# Patient Record
Sex: FEMALE | Race: WHITE | NOT HISPANIC OR LATINO | Employment: FULL TIME | URBAN - METROPOLITAN AREA
[De-identification: names, ages, dates, MRNs, and addresses within clinical notes are randomized per-mention and may not be internally consistent; named-entity substitution may affect disease eponyms.]

---

## 2018-06-08 ENCOUNTER — ANESTHESIA - HEALTHEAST (OUTPATIENT)
Dept: SURGERY | Facility: CLINIC | Age: 33
End: 2018-06-08

## 2018-06-08 ENCOUNTER — SURGERY - HEALTHEAST (OUTPATIENT)
Dept: SURGERY | Facility: CLINIC | Age: 33
End: 2018-06-08

## 2020-06-23 ENCOUNTER — COMMUNICATION - HEALTHEAST (OUTPATIENT)
Dept: MEDSURG UNIT | Facility: CLINIC | Age: 35
End: 2020-06-23

## 2020-06-23 ENCOUNTER — COMMUNICATION - HEALTHEAST (OUTPATIENT)
Dept: SCHEDULING | Facility: CLINIC | Age: 35
End: 2020-06-23

## 2021-05-28 ENCOUNTER — RECORDS - HEALTHEAST (OUTPATIENT)
Dept: ADMINISTRATIVE | Facility: CLINIC | Age: 36
End: 2021-05-28

## 2021-05-30 ENCOUNTER — RECORDS - HEALTHEAST (OUTPATIENT)
Dept: ADMINISTRATIVE | Facility: CLINIC | Age: 36
End: 2021-05-30

## 2021-06-01 VITALS — WEIGHT: 125 LBS | BODY MASS INDEX: 24.41 KG/M2

## 2021-06-02 ENCOUNTER — RECORDS - HEALTHEAST (OUTPATIENT)
Dept: ADMINISTRATIVE | Facility: CLINIC | Age: 36
End: 2021-06-02

## 2021-06-09 NOTE — TELEPHONE ENCOUNTER
Patient reports she received a Methotrexate shot in her leg for an ectopic pregnancy. Was discharged home from the [facility] today. Patient reports she was having vaginal bleeding. She soaked 2 thin pads in one hour. Reports bleeding just stopped. Patient was told on the discharge paperwork to follow up with Dr Gomez at [facility] on 6/26. Will page on call provider for [facility] to call patient back directly.     RN paged on call provider for [facility], Dr Santoyo via answering service at 5:35 pm to call patient back directly at [phone].    Rn then called patient to let her know that the doctor on call will call her. Patient states the Doctor already called and she spoke to the on call. Patient had no further questions.     Caryn Alston RN/[facility]      Reason for Disposition    [1] Caller requests to speak ONLY to PCP AND [2] URGENT question    Additional Information    Negative: Lab calling with strep throat test results and triager can call in prescription    Negative: Lab calling with urinalysis test results and triager can call in prescription    Negative: Medication questions    Negative: ED call to PCP    Negative: Physician call to PCP    Negative: Call about patient who is currently hospitalized    Negative: Lab or radiology calling with CRITICAL test results    Negative: [1] Prescription not at pharmacy AND [2] was prescribed today by PCP    Negative: [1] Follow-up call from patient regarding patient's clinical status AND [2] information urgent    Protocols used: [facility]

## 2021-06-16 PROBLEM — N93.9 VAGINAL BLEEDING: Status: ACTIVE | Noted: 2020-06-22

## 2021-06-16 PROBLEM — O00.90 ECTOPIC PREGNANCY: Status: ACTIVE | Noted: 2018-06-08

## 2021-06-18 NOTE — ANESTHESIA POSTPROCEDURE EVALUATION
Patient: El Booth  LAPAROSCOPY LEFT SIDED SALPINGECTOMY EVACUATION HEMO PERITONEUM  Anesthesia type: general    Patient location: Phase II Recovery  Last vitals:   Vitals:    06/08/18 0445   BP: 100/52   Pulse: 88   Resp: 16   Temp: 36.7  C (98.1  F)   SpO2: 97%     Post vital signs: stable  Level of consciousness: awake and responds to simple questions  Post-anesthesia pain: pain controlled  Post-anesthesia nausea and vomiting: no  Pulmonary: unassisted, return to baseline  Cardiovascular: stable and blood pressure at baseline  Hydration: adequate  Anesthetic events: no    QCDR Measures:  ASA# 11 - Tiarra-op Cardiac Arrest: ASA11B - Patient did NOT experience unanticipated cardiac arrest  ASA# 12 - Tiarra-op Mortality Rate: ASA12B - Patient did NOT die  ASA# 13 - PACU Re-Intubation Rate: ASA13B - Patient did NOT require a new airway mgmt  ASA# 10 - Composite Anes Safety: ASA10A - No serious adverse event    Additional Notes: doing well, no complaints; OK to discharge home.

## 2021-06-18 NOTE — ANESTHESIA CARE TRANSFER NOTE
Last vitals:   Vitals:    06/08/18 0332   BP: 105/61   Pulse: 85   Resp: 16   Temp: 36.6  C (97.9  F)   SpO2: 100%     Patient's level of consciousness is awake  Spontaneous respirations: yes  Maintains airway independently: yes  Dentition unchanged: yes  Oropharynx: oropharynx clear of all foreign objects    QCDR Measures:  ASA# 20 - Surgical Safety Checklist: WHO surgical safety checklist completed prior to induction  PQRS# 430 - Adult PONV Prevention: 4558F - Pt received => 2 anti-emetic agents (different classes) preop & intraop  ASA# 8 - Peds PONV Prevention: NA - Not pediatric patient, not GA or 2 or more risk factors NOT present  PQRS# 424 - Tiarra-op Temp Management: 4559F - At least one body temp DOCUMENTED => 35.5C or 95.9F within required timeframe  PQRS# 426 - PACU Transfer Protocol: - Transfer of care checklist used  ASA# 14 - Acute Post-op Pain: ASA14B - Patient did NOT experience pain >= 7 out of 10

## 2021-06-18 NOTE — ANESTHESIA PREPROCEDURE EVALUATION
Anesthesia Evaluation      Patient summary reviewed   No history of anesthetic complications     Airway   Mallampati: I  Neck ROM: full   Pulmonary - negative ROS and normal exam   (+) a smoker                         Cardiovascular - negative ROS and normal exam  Exercise tolerance: > or = 4 METS   Neuro/Psych    (+) depression, anxiety/panic attacks,     Comments: Chronic headache without history of migraine.    Endo/Other - negative ROS      GI/Hepatic/Renal - negative ROS           Dental - normal exam                        Anesthesia Plan  Planned anesthetic: general endotracheal  Decadron, Zofran.  Diprivan infusion.  Ketamine.  Toradol, Tylenol.  ASA 2   Induction: intravenous   Anesthetic plan and risks discussed with: patient  Anesthesia plan special considerations: antiemetics,   Post-op plan: routine recovery

## 2021-06-20 NOTE — LETTER
Letter by Nissen, Lynette, RN at      Author: Nissen, Lynette, RN Service: -- Author Type: --    Filed:  Encounter Date: 6/23/2020 Status: (Other)       6/23/2020        El Booth  [address]  [facility] [address]    This letter provides a written record that you were tested for COVID-19 on 6/22/20.     Your result was negative. This means that we didnt find the virus that causes COVID-19 in your sample. A test may show negative when you do actually have the virus. This can happen when the virus is in the early stages of infection, before you feel illness symptoms.    If you have symptoms   Stay home and away from others (self-isolate) until you meet ALL of the guidelines below:    Youve had no fever--and no medicine that reduces fever--for 3 full days (72 hours). And ?    Your other symptoms have gotten better. For example, your cough or breathing has improved. And?    At least 10 days have passed since your symptoms started.    During this time:    Stay home. Dont go to work, school or anywhere else.     Stay in your own room, including for meals. Use your own bathroom if you can.    Stay away from others in your home. No hugging, kissing or shaking hands. No visitors.    Clean high touch surfaces often (doorknobs, counters, handles, etc.). Use a household cleaning spray or wipes. You can find a full list on the EPA website at www.epa.gov/pesticide-registration/list-n-disinfectants-use-against-sars-cov-2.    Cover your mouth and nose with a mask, tissue or washcloth to avoid spreading germs.    Wash your hands and face often with soap and water.    Going back to work  Check with your employer for any guidelines to follow for going back to work.    Employers: This document serves as formal notice that your employee tested negative for COVID-19, as of the testing date shown above.

## 2021-06-27 ENCOUNTER — HEALTH MAINTENANCE LETTER (OUTPATIENT)
Age: 36
End: 2021-06-27

## 2021-10-16 ENCOUNTER — HEALTH MAINTENANCE LETTER (OUTPATIENT)
Age: 36
End: 2021-10-16

## 2022-01-19 ENCOUNTER — HOSPITAL ENCOUNTER (EMERGENCY)
Facility: CLINIC | Age: 37
Discharge: HOME | End: 2022-01-19
Attending: EMERGENCY MEDICINE | Admitting: EMERGENCY MEDICINE
Payer: COMMERCIAL

## 2022-01-19 VITALS
HEART RATE: 111 BPM | OXYGEN SATURATION: 97 % | WEIGHT: 123 LBS | SYSTOLIC BLOOD PRESSURE: 115 MMHG | TEMPERATURE: 98.1 F | DIASTOLIC BLOOD PRESSURE: 72 MMHG | BODY MASS INDEX: 24.02 KG/M2 | RESPIRATION RATE: 18 BRPM

## 2022-01-19 DIAGNOSIS — G43.109 MIGRAINE WITH AURA AND WITHOUT STATUS MIGRAINOSUS, NOT INTRACTABLE: ICD-10-CM

## 2022-01-19 PROCEDURE — 99284 EMERGENCY DEPT VISIT MOD MDM: CPT | Mod: 25

## 2022-01-19 PROCEDURE — 250N000011 HC RX IP 250 OP 636: Performed by: EMERGENCY MEDICINE

## 2022-01-19 PROCEDURE — 96361 HYDRATE IV INFUSION ADD-ON: CPT

## 2022-01-19 PROCEDURE — 96375 TX/PRO/DX INJ NEW DRUG ADDON: CPT

## 2022-01-19 PROCEDURE — 258N000003 HC RX IP 258 OP 636: Performed by: EMERGENCY MEDICINE

## 2022-01-19 PROCEDURE — 96374 THER/PROPH/DIAG INJ IV PUSH: CPT

## 2022-01-19 RX ORDER — ESCITALOPRAM OXALATE 5 MG/1
5 TABLET ORAL DAILY
COMMUNITY
End: 2024-02-02

## 2022-01-19 RX ORDER — SODIUM CHLORIDE 9 MG/ML
INJECTION, SOLUTION INTRAVENOUS CONTINUOUS
Status: DISCONTINUED | OUTPATIENT
Start: 2022-01-19 | End: 2022-01-19 | Stop reason: HOSPADM

## 2022-01-19 RX ORDER — TRAZODONE HYDROCHLORIDE 50 MG/1
50 TABLET, FILM COATED ORAL AT BEDTIME
COMMUNITY
End: 2024-02-02

## 2022-01-19 RX ORDER — KETOROLAC TROMETHAMINE 15 MG/ML
15 INJECTION, SOLUTION INTRAMUSCULAR; INTRAVENOUS ONCE
Status: COMPLETED | OUTPATIENT
Start: 2022-01-19 | End: 2022-01-19

## 2022-01-19 RX ORDER — METOCLOPRAMIDE HYDROCHLORIDE 5 MG/ML
10 INJECTION INTRAMUSCULAR; INTRAVENOUS ONCE
Status: COMPLETED | OUTPATIENT
Start: 2022-01-19 | End: 2022-01-19

## 2022-01-19 RX ADMIN — METOCLOPRAMIDE HYDROCHLORIDE 10 MG: 5 INJECTION INTRAMUSCULAR; INTRAVENOUS at 02:10

## 2022-01-19 RX ADMIN — SODIUM CHLORIDE 1000 ML: 9 INJECTION, SOLUTION INTRAVENOUS at 02:10

## 2022-01-19 RX ADMIN — KETOROLAC TROMETHAMINE 15 MG: 15 INJECTION, SOLUTION INTRAMUSCULAR; INTRAVENOUS at 02:09

## 2022-01-19 ASSESSMENT — ENCOUNTER SYMPTOMS
NAUSEA: 1
VOMITING: 1
HEADACHES: 1
PHOTOPHOBIA: 1
WEAKNESS: 0

## 2022-01-19 NOTE — ED NOTES
Pt arrives to ED with c/o severe headache with nausea and photosensitivity. Patient presents with visible tremors and is tachycardic on assessment. VS otherwise stable.

## 2022-01-19 NOTE — ED PROVIDER NOTES
EMERGENCY DEPARTMENT ENCOUNTER      NAME: El Booth  AGE: 36 year old female  YOB: 1985  MRN: [id number]  EVALUATION DATE & TIME: 1/19/2022  1:40 AM    PCP: No primary care provider on file.    ED PROVIDER: Luis Mullins M.D.      Chief Complaint   Patient presents with     Headache         FINAL IMPRESSION:  1. Migraine with aura and without status migrainosus, not intractable          ED COURSE & MEDICAL DECISION MAKING:    Pertinent Labs & Imaging studies reviewed. (See chart for details)  36 year old female presents to the Emergency Department for evaluation of headache.  This seems to be her typical migraine.  Normal neurologic exam.  No signs of trauma.  No occasion for imaging.  Did place an IV.  Patient's pain improved with IV Reglan, IV Toradol and IV fluids.  Will discharge home.  She will follow-up with her primary.  Return for worsening symptoms.    1:52 AM I met with the patient to gather history and to perform my initial exam. I discussed the plan for care while in the Emergency Department. PPE: Mask and eye protection.    At the conclusion of the encounter I discussed the results of all of the tests and the disposition. The questions were answered. The patient or family acknowledged understanding and was agreeable with the care plan.          MEDICATIONS GIVEN IN THE EMERGENCY:  Medications   0.9% sodium chloride BOLUS (0 mLs Intravenous Stopped 1/19/22 0325)     Followed by   sodium chloride 0.9% infusion (has no administration in time range)   metoclopramide (REGLAN) injection 10 mg (10 mg Intravenous Given 1/19/22 0210)   ketorolac (TORADOL) injection 15 mg (15 mg Intravenous Given 1/19/22 0209)       NEW PRESCRIPTIONS STARTED AT TODAY'S ER VISIT  Discharge Medication List as of 1/19/2022  3:27 AM             =================================================================    HPI    Patient information was obtained from: Patient    Use of : N/A       El  DAIANA Booth is a 36 year old female with a pertinent history of migraines, who presents to this ED via walk in for evaluation of a headache.    The patient reports a migraine for the last three days that starts in the back of her head and then goes to the front.  The patient reports this feels like her typical migraine.  She reports associated nausea, vomiting, and photophobia. She denies any medication allergies. She denies any vision problems, weakness, or other complaints at this time.     REVIEW OF SYSTEMS   Review of Systems   Eyes: Positive for photophobia. Negative for visual disturbance.   Gastrointestinal: Positive for nausea and vomiting.   Neurological: Positive for headaches. Negative for weakness.   All other systems reviewed and are negative.       PAST MEDICAL HISTORY:  Past Medical History:   Diagnosis Date     Anxiety      Chronic headache      Ectopic pregnancy        PAST SURGICAL HISTORY:  Past Surgical History:   Procedure Laterality Date     CT LAP,DIAGNOSTIC ABDOMEN N/A 6/8/2018    Procedure: LAPAROSCOPY LEFT SIDED SALPINGECTOMY EVACUATION HEMO PERITONEUM;  Surgeon: Clemencia Aleman DO;  Location: [facility] Main OR;  Service: Gynecology       CURRENT MEDICATIONS:    Current Facility-Administered Medications   Medication     sodium chloride 0.9% infusion     Current Outpatient Medications   Medication     escitalopram (LEXAPRO) 5 MG tablet     traZODone (DESYREL) 50 MG tablet         ALLERGIES:  No Known Allergies    FAMILY HISTORY:  Family History   Problem Relation Age of Onset     Depression Mother      Depression Father      Hyperlipidemia Father        SOCIAL HISTORY:   Social History     Socioeconomic History     Marital status: Single     Spouse name: None     Number of children: None     Years of education: None     Highest education level: None   Occupational History     None   Tobacco Use     Smoking status: Former Smoker     Smokeless tobacco: None   Substance and Sexual  Activity     Alcohol use: Yes     Drug use: Yes     Types: Oxycodone     Sexual activity: None   Other Topics Concern     None   Social History Narrative     None     Social Determinants of Health     Financial Resource Strain: Not on file   Food Insecurity: Not on file   Transportation Needs: Not on file   Physical Activity: Not on file   Stress: Not on file   Social Connections: Not on file   Intimate Partner Violence: Not on file   Housing Stability: Not on file       VITALS:  /72   Pulse 111   Temp 98.1  F (36.7  C) (Temporal)   Resp 18   Wt 55.8 kg (123 lb)   LMP 12/27/2021 (Approximate)   SpO2 97%   BMI 24.02 kg/m       PHYSICAL EXAM    Physical Exam  Constitutional:       General: She is not in acute distress.     Appearance: She is not diaphoretic.   HENT:      Head: Atraumatic.      Mouth/Throat:      Pharynx: No oropharyngeal exudate.   Eyes:      General: No scleral icterus.     Pupils: Pupils are equal, round, and reactive to light.   Cardiovascular:      Heart sounds: Normal heart sounds.   Pulmonary:      Effort: No respiratory distress.      Breath sounds: Normal breath sounds.   Abdominal:      Palpations: Abdomen is soft.      Tenderness: There is no abdominal tenderness. There is no guarding or rebound.   Musculoskeletal:         General: No tenderness.   Skin:     General: Skin is warm.      Findings: No rash.   Neurological:      General: No focal deficit present.      Mental Status: She is alert.      Comments: 5 out of 5 strength in bilateral upper and lower extremities.  Sensation intact in all 4 extremes.  Cranial nerves intact.  No pronator drift            LAB:  All pertinent labs reviewed and interpreted.  Labs Ordered and Resulted from Time of ED Arrival to Time of ED Departure - No data to display    RADIOLOGY:  Reviewed all pertinent imaging. Please see official radiology report.  No orders to display       PROCEDURES:   None      Carson VEGA, am serving as a scribe  to document services personally performed by Dr. Luis Mullins, based on my observation and the provider's statements to me. I, Luis Mullins MD attest that Carson Salinas is acting in a scribe capacity, has observed my performance of the services and has documented them in accordance with my direction.    Luis Mullins M.D.  Emergency Medicine  [facility] EMERGENCY ROOM  [address] [facility] [id number] [phone]  Dept: [phone]      Luis Mullins MD  01/19/22 040

## 2022-01-19 NOTE — Clinical Note
El Booth was seen and treated in our emergency department on 1/19/2022.  She may return to work on 01/20/2022.       If you have any questions or concerns, please don't hesitate to call.      Luis Mullins MD

## 2022-07-23 ENCOUNTER — HEALTH MAINTENANCE LETTER (OUTPATIENT)
Age: 37
End: 2022-07-23

## 2022-10-01 ENCOUNTER — HEALTH MAINTENANCE LETTER (OUTPATIENT)
Age: 37
End: 2022-10-01

## 2023-08-06 ENCOUNTER — HEALTH MAINTENANCE LETTER (OUTPATIENT)
Age: 38
End: 2023-08-06

## 2024-02-02 ENCOUNTER — APPOINTMENT (OUTPATIENT)
Dept: ULTRASOUND IMAGING | Facility: CLINIC | Age: 39
End: 2024-02-02
Attending: EMERGENCY MEDICINE
Payer: COMMERCIAL

## 2024-02-02 ENCOUNTER — APPOINTMENT (OUTPATIENT)
Dept: CT IMAGING | Facility: CLINIC | Age: 39
End: 2024-02-02
Attending: EMERGENCY MEDICINE
Payer: COMMERCIAL

## 2024-02-02 ENCOUNTER — HOSPITAL ENCOUNTER (OUTPATIENT)
Facility: CLINIC | Age: 39
Setting detail: OBSERVATION
Discharge: HOME | End: 2024-02-03
Attending: EMERGENCY MEDICINE | Admitting: SURGERY
Payer: COMMERCIAL

## 2024-02-02 DIAGNOSIS — N83.202 LEFT OVARIAN CYST: ICD-10-CM

## 2024-02-02 DIAGNOSIS — K80.20 SYMPTOMATIC CHOLELITHIASIS: Primary | ICD-10-CM

## 2024-02-02 DIAGNOSIS — R10.84 GENERALIZED ABDOMINAL PAIN: ICD-10-CM

## 2024-02-02 DIAGNOSIS — K80.20 GALLSTONES: ICD-10-CM

## 2024-02-02 DIAGNOSIS — K80.50 BILIARY COLIC: ICD-10-CM

## 2024-02-02 LAB
ALBUMIN SERPL BCG-MCNC: 4.2 G/DL (ref 3.5–5.2)
ALP SERPL-CCNC: 103 U/L (ref 40–150)
ALT SERPL W P-5'-P-CCNC: 12 U/L (ref 0–50)
ANION GAP SERPL CALCULATED.3IONS-SCNC: 13 MMOL/L (ref 7–15)
AST SERPL W P-5'-P-CCNC: 15 U/L (ref 0–45)
ATRIAL RATE - MUSE: 132 BPM
BASOPHILS # BLD AUTO: 0 10E3/UL (ref 0–0.2)
BASOPHILS NFR BLD AUTO: 0 %
BILIRUB DIRECT SERPL-MCNC: <0.2 MG/DL (ref 0–0.3)
BILIRUB SERPL-MCNC: 0.2 MG/DL
BUN SERPL-MCNC: 7.8 MG/DL (ref 6–20)
CALCIUM SERPL-MCNC: 9.3 MG/DL (ref 8.6–10)
CHLORIDE SERPL-SCNC: 102 MMOL/L (ref 98–107)
CREAT SERPL-MCNC: 0.62 MG/DL (ref 0.51–0.95)
DEPRECATED HCO3 PLAS-SCNC: 21 MMOL/L (ref 22–29)
DIASTOLIC BLOOD PRESSURE - MUSE: NORMAL MMHG
EGFRCR SERPLBLD CKD-EPI 2021: >90 ML/MIN/1.73M2
EOSINOPHIL # BLD AUTO: 0 10E3/UL (ref 0–0.7)
EOSINOPHIL NFR BLD AUTO: 0 %
ERYTHROCYTE [DISTWIDTH] IN BLOOD BY AUTOMATED COUNT: 13.1 % (ref 10–15)
FLUAV RNA SPEC QL NAA+PROBE: NEGATIVE
FLUBV RNA RESP QL NAA+PROBE: NEGATIVE
GLUCOSE SERPL-MCNC: 131 MG/DL (ref 70–99)
HCG SERPL QL: NEGATIVE
HCT VFR BLD AUTO: 44.3 % (ref 35–47)
HGB BLD-MCNC: 15.5 G/DL (ref 11.7–15.7)
HOLD SPECIMEN: NORMAL
HOLD SPECIMEN: NORMAL
IMM GRANULOCYTES # BLD: 0 10E3/UL
IMM GRANULOCYTES NFR BLD: 0 %
INTERPRETATION ECG - MUSE: NORMAL
LIPASE SERPL-CCNC: 36 U/L (ref 13–60)
LYMPHOCYTES # BLD AUTO: 1.2 10E3/UL (ref 0.8–5.3)
LYMPHOCYTES NFR BLD AUTO: 12 %
MCH RBC QN AUTO: 32.6 PG (ref 26.5–33)
MCHC RBC AUTO-ENTMCNC: 35 G/DL (ref 31.5–36.5)
MCV RBC AUTO: 93 FL (ref 78–100)
MONOCYTES # BLD AUTO: 0.4 10E3/UL (ref 0–1.3)
MONOCYTES NFR BLD AUTO: 4 %
NEUTROPHILS # BLD AUTO: 8.1 10E3/UL (ref 1.6–8.3)
NEUTROPHILS NFR BLD AUTO: 84 %
NRBC # BLD AUTO: 0 10E3/UL
NRBC BLD AUTO-RTO: 0 /100
P AXIS - MUSE: 65 DEGREES
PLATELET # BLD AUTO: 326 10E3/UL (ref 150–450)
POTASSIUM SERPL-SCNC: 3.7 MMOL/L (ref 3.4–5.3)
PR INTERVAL - MUSE: 142 MS
PROT SERPL-MCNC: 7.3 G/DL (ref 6.4–8.3)
QRS DURATION - MUSE: 68 MS
QT - MUSE: 282 MS
QTC - MUSE: 417 MS
R AXIS - MUSE: 94 DEGREES
RBC # BLD AUTO: 4.75 10E6/UL (ref 3.8–5.2)
RSV RNA SPEC NAA+PROBE: NEGATIVE
SARS-COV-2 RNA RESP QL NAA+PROBE: NEGATIVE
SODIUM SERPL-SCNC: 136 MMOL/L (ref 135–145)
SYSTOLIC BLOOD PRESSURE - MUSE: NORMAL MMHG
T AXIS - MUSE: 21 DEGREES
TROPONIN T SERPL HS-MCNC: <6 NG/L
VENTRICULAR RATE- MUSE: 132 BPM
WBC # BLD AUTO: 9.8 10E3/UL (ref 4–11)

## 2024-02-02 PROCEDURE — 76705 ECHO EXAM OF ABDOMEN: CPT

## 2024-02-02 PROCEDURE — 83690 ASSAY OF LIPASE: CPT | Performed by: EMERGENCY MEDICINE

## 2024-02-02 PROCEDURE — G0378 HOSPITAL OBSERVATION PER HR: HCPCS

## 2024-02-02 PROCEDURE — 250N000011 HC RX IP 250 OP 636: Performed by: EMERGENCY MEDICINE

## 2024-02-02 PROCEDURE — 250N000011 HC RX IP 250 OP 636: Performed by: STUDENT IN AN ORGANIZED HEALTH CARE EDUCATION/TRAINING PROGRAM

## 2024-02-02 PROCEDURE — 258N000003 HC RX IP 258 OP 636: Performed by: EMERGENCY MEDICINE

## 2024-02-02 PROCEDURE — 96375 TX/PRO/DX INJ NEW DRUG ADDON: CPT

## 2024-02-02 PROCEDURE — 36415 COLL VENOUS BLD VENIPUNCTURE: CPT | Performed by: EMERGENCY MEDICINE

## 2024-02-02 PROCEDURE — 85025 COMPLETE CBC W/AUTO DIFF WBC: CPT | Performed by: EMERGENCY MEDICINE

## 2024-02-02 PROCEDURE — 99223 1ST HOSP IP/OBS HIGH 75: CPT | Performed by: STUDENT IN AN ORGANIZED HEALTH CARE EDUCATION/TRAINING PROGRAM

## 2024-02-02 PROCEDURE — 84703 CHORIONIC GONADOTROPIN ASSAY: CPT | Performed by: EMERGENCY MEDICINE

## 2024-02-02 PROCEDURE — 96376 TX/PRO/DX INJ SAME DRUG ADON: CPT

## 2024-02-02 PROCEDURE — 82040 ASSAY OF SERUM ALBUMIN: CPT | Performed by: EMERGENCY MEDICINE

## 2024-02-02 PROCEDURE — 96361 HYDRATE IV INFUSION ADD-ON: CPT

## 2024-02-02 PROCEDURE — 84484 ASSAY OF TROPONIN QUANT: CPT | Performed by: EMERGENCY MEDICINE

## 2024-02-02 PROCEDURE — 250N000013 HC RX MED GY IP 250 OP 250 PS 637: Performed by: EMERGENCY MEDICINE

## 2024-02-02 PROCEDURE — 76856 US EXAM PELVIC COMPLETE: CPT

## 2024-02-02 PROCEDURE — 99285 EMERGENCY DEPT VISIT HI MDM: CPT | Mod: 25

## 2024-02-02 PROCEDURE — 250N000013 HC RX MED GY IP 250 OP 250 PS 637: Performed by: STUDENT IN AN ORGANIZED HEALTH CARE EDUCATION/TRAINING PROGRAM

## 2024-02-02 PROCEDURE — 87637 SARSCOV2&INF A&B&RSV AMP PRB: CPT | Performed by: EMERGENCY MEDICINE

## 2024-02-02 PROCEDURE — 96374 THER/PROPH/DIAG INJ IV PUSH: CPT

## 2024-02-02 PROCEDURE — 76830 TRANSVAGINAL US NON-OB: CPT

## 2024-02-02 PROCEDURE — 93005 ELECTROCARDIOGRAM TRACING: CPT | Performed by: EMERGENCY MEDICINE

## 2024-02-02 PROCEDURE — 96376 TX/PRO/DX INJ SAME DRUG ADON: CPT | Mod: 59

## 2024-02-02 PROCEDURE — 71275 CT ANGIOGRAPHY CHEST: CPT

## 2024-02-02 RX ORDER — OMEPRAZOLE 40 MG/1
40 CAPSULE, DELAYED RELEASE ORAL AT BEDTIME
COMMUNITY
Start: 2024-01-20

## 2024-02-02 RX ORDER — ONDANSETRON 2 MG/ML
4 INJECTION INTRAMUSCULAR; INTRAVENOUS EVERY 6 HOURS PRN
Status: DISCONTINUED | OUTPATIENT
Start: 2024-02-02 | End: 2024-02-03 | Stop reason: HOSPADM

## 2024-02-02 RX ORDER — AMOXICILLIN 250 MG
1 CAPSULE ORAL 2 TIMES DAILY PRN
Status: DISCONTINUED | OUTPATIENT
Start: 2024-02-02 | End: 2024-02-03 | Stop reason: HOSPADM

## 2024-02-02 RX ORDER — NALOXONE HYDROCHLORIDE 0.4 MG/ML
0.4 INJECTION, SOLUTION INTRAMUSCULAR; INTRAVENOUS; SUBCUTANEOUS
Status: DISCONTINUED | OUTPATIENT
Start: 2024-02-02 | End: 2024-02-03 | Stop reason: HOSPADM

## 2024-02-02 RX ORDER — ESCITALOPRAM OXALATE 10 MG/1
10 TABLET ORAL AT BEDTIME
Status: DISCONTINUED | OUTPATIENT
Start: 2024-02-02 | End: 2024-02-03 | Stop reason: HOSPADM

## 2024-02-02 RX ORDER — NICOTINE 21 MG/24HR
1 PATCH, TRANSDERMAL 24 HOURS TRANSDERMAL DAILY
Status: DISCONTINUED | OUTPATIENT
Start: 2024-02-02 | End: 2024-02-03 | Stop reason: HOSPADM

## 2024-02-02 RX ORDER — KETOROLAC TROMETHAMINE 15 MG/ML
15 INJECTION, SOLUTION INTRAMUSCULAR; INTRAVENOUS EVERY 6 HOURS PRN
Status: DISCONTINUED | OUTPATIENT
Start: 2024-02-02 | End: 2024-02-03 | Stop reason: HOSPADM

## 2024-02-02 RX ORDER — ONDANSETRON 4 MG/1
4 TABLET, ORALLY DISINTEGRATING ORAL EVERY 6 HOURS PRN
Status: DISCONTINUED | OUTPATIENT
Start: 2024-02-02 | End: 2024-02-03 | Stop reason: HOSPADM

## 2024-02-02 RX ORDER — PROCHLORPERAZINE 25 MG
25 SUPPOSITORY, RECTAL RECTAL EVERY 12 HOURS PRN
Status: DISCONTINUED | OUTPATIENT
Start: 2024-02-02 | End: 2024-02-03 | Stop reason: HOSPADM

## 2024-02-02 RX ORDER — SODIUM CHLORIDE 9 MG/ML
INJECTION, SOLUTION INTRAVENOUS CONTINUOUS
Status: DISCONTINUED | OUTPATIENT
Start: 2024-02-02 | End: 2024-02-03 | Stop reason: HOSPADM

## 2024-02-02 RX ORDER — MULTIVITAMIN
1 TABLET ORAL AT BEDTIME
COMMUNITY

## 2024-02-02 RX ORDER — MORPHINE SULFATE 4 MG/ML
4 INJECTION, SOLUTION INTRAMUSCULAR; INTRAVENOUS
Status: COMPLETED | OUTPATIENT
Start: 2024-02-02 | End: 2024-02-03

## 2024-02-02 RX ORDER — TRIAMCINOLONE ACETONIDE 0.1 %
PASTE (GRAM) DENTAL 2 TIMES DAILY
COMMUNITY
Start: 2024-01-20

## 2024-02-02 RX ORDER — ACETAMINOPHEN 325 MG/1
650 TABLET ORAL EVERY 4 HOURS PRN
Status: DISCONTINUED | OUTPATIENT
Start: 2024-02-02 | End: 2024-02-03 | Stop reason: HOSPADM

## 2024-02-02 RX ORDER — AMOXICILLIN 250 MG
2 CAPSULE ORAL 2 TIMES DAILY PRN
Status: DISCONTINUED | OUTPATIENT
Start: 2024-02-02 | End: 2024-02-03 | Stop reason: HOSPADM

## 2024-02-02 RX ORDER — NALOXONE HYDROCHLORIDE 0.4 MG/ML
0.2 INJECTION, SOLUTION INTRAMUSCULAR; INTRAVENOUS; SUBCUTANEOUS
Status: DISCONTINUED | OUTPATIENT
Start: 2024-02-02 | End: 2024-02-03 | Stop reason: HOSPADM

## 2024-02-02 RX ORDER — ASCORBIC ACID 500 MG
500 TABLET ORAL AT BEDTIME
COMMUNITY

## 2024-02-02 RX ORDER — TRAZODONE HYDROCHLORIDE 150 MG/1
150 TABLET ORAL AT BEDTIME
Status: DISCONTINUED | OUTPATIENT
Start: 2024-02-02 | End: 2024-02-03 | Stop reason: HOSPADM

## 2024-02-02 RX ORDER — ONDANSETRON 2 MG/ML
4 INJECTION INTRAMUSCULAR; INTRAVENOUS ONCE
Status: COMPLETED | OUTPATIENT
Start: 2024-02-02 | End: 2024-02-02

## 2024-02-02 RX ORDER — BIOTIN 1 MG
1000 TABLET ORAL AT BEDTIME
COMMUNITY

## 2024-02-02 RX ORDER — MAGNESIUM HYDROXIDE/ALUMINUM HYDROXICE/SIMETHICONE 120; 1200; 1200 MG/30ML; MG/30ML; MG/30ML
15 SUSPENSION ORAL ONCE
Status: COMPLETED | OUTPATIENT
Start: 2024-02-02 | End: 2024-02-02

## 2024-02-02 RX ORDER — PANTOPRAZOLE SODIUM 40 MG/1
40 TABLET, DELAYED RELEASE ORAL 2 TIMES DAILY
Status: DISCONTINUED | OUTPATIENT
Start: 2024-02-02 | End: 2024-02-03 | Stop reason: HOSPADM

## 2024-02-02 RX ORDER — IOPAMIDOL 755 MG/ML
90 INJECTION, SOLUTION INTRAVASCULAR ONCE
Status: COMPLETED | OUTPATIENT
Start: 2024-02-02 | End: 2024-02-02

## 2024-02-02 RX ORDER — KETOROLAC TROMETHAMINE 15 MG/ML
15 INJECTION, SOLUTION INTRAMUSCULAR; INTRAVENOUS ONCE
Status: COMPLETED | OUTPATIENT
Start: 2024-02-02 | End: 2024-02-02

## 2024-02-02 RX ORDER — TRAZODONE HYDROCHLORIDE 100 MG/1
150 TABLET ORAL AT BEDTIME
COMMUNITY
Start: 2024-01-15

## 2024-02-02 RX ORDER — ESCITALOPRAM OXALATE 10 MG/1
10 TABLET ORAL AT BEDTIME
COMMUNITY
Start: 2024-01-15

## 2024-02-02 RX ORDER — VITAMIN B COMPLEX
1 TABLET ORAL AT BEDTIME
COMMUNITY

## 2024-02-02 RX ORDER — ACETAMINOPHEN 650 MG/1
650 SUPPOSITORY RECTAL EVERY 4 HOURS PRN
Status: DISCONTINUED | OUTPATIENT
Start: 2024-02-02 | End: 2024-02-03 | Stop reason: HOSPADM

## 2024-02-02 RX ORDER — PROCHLORPERAZINE MALEATE 10 MG
10 TABLET ORAL EVERY 6 HOURS PRN
Status: DISCONTINUED | OUTPATIENT
Start: 2024-02-02 | End: 2024-02-03 | Stop reason: HOSPADM

## 2024-02-02 RX ADMIN — NICOTINE 1 PATCH: 21 PATCH, EXTENDED RELEASE TRANSDERMAL at 17:35

## 2024-02-02 RX ADMIN — OXYCODONE HYDROCHLORIDE 2.5 MG: 5 TABLET ORAL at 20:16

## 2024-02-02 RX ADMIN — ALUMINUM HYDROXIDE, MAGNESIUM HYDROXIDE, AND SIMETHICONE 15 ML: 200; 200; 20 SUSPENSION ORAL at 16:49

## 2024-02-02 RX ADMIN — SODIUM CHLORIDE: 9 INJECTION, SOLUTION INTRAVENOUS at 17:35

## 2024-02-02 RX ADMIN — TRAZODONE HYDROCHLORIDE 150 MG: 100 TABLET ORAL at 21:58

## 2024-02-02 RX ADMIN — KETOROLAC TROMETHAMINE 15 MG: 15 INJECTION, SOLUTION INTRAMUSCULAR; INTRAVENOUS at 13:25

## 2024-02-02 RX ADMIN — KETOROLAC TROMETHAMINE 15 MG: 15 INJECTION, SOLUTION INTRAMUSCULAR; INTRAVENOUS at 21:59

## 2024-02-02 RX ADMIN — IOPAMIDOL 90 ML: 755 INJECTION, SOLUTION INTRAVENOUS at 14:08

## 2024-02-02 RX ADMIN — ONDANSETRON 4 MG: 2 INJECTION INTRAMUSCULAR; INTRAVENOUS at 20:02

## 2024-02-02 RX ADMIN — MORPHINE SULFATE 4 MG: 4 INJECTION, SOLUTION INTRAMUSCULAR; INTRAVENOUS at 17:35

## 2024-02-02 RX ADMIN — FAMOTIDINE 20 MG: 10 INJECTION, SOLUTION INTRAVENOUS at 15:05

## 2024-02-02 RX ADMIN — ONDANSETRON 4 MG: 2 INJECTION INTRAMUSCULAR; INTRAVENOUS at 17:13

## 2024-02-02 RX ADMIN — ONDANSETRON 4 MG: 2 INJECTION INTRAMUSCULAR; INTRAVENOUS at 13:25

## 2024-02-02 RX ADMIN — PANTOPRAZOLE SODIUM 40 MG: 40 TABLET, DELAYED RELEASE ORAL at 21:58

## 2024-02-02 RX ADMIN — ESCITALOPRAM OXALATE 10 MG: 10 TABLET ORAL at 21:58

## 2024-02-02 RX ADMIN — SODIUM CHLORIDE 1000 ML: 9 INJECTION, SOLUTION INTRAVENOUS at 13:25

## 2024-02-02 ASSESSMENT — ENCOUNTER SYMPTOMS
NAUSEA: 1
ABDOMINAL PAIN: 1
DIARRHEA: 1
VOMITING: 1

## 2024-02-02 ASSESSMENT — ACTIVITIES OF DAILY LIVING (ADL)
ADLS_ACUITY_SCORE: 35
ADLS_ACUITY_SCORE: 35
ADLS_ACUITY_SCORE: 37
ADLS_ACUITY_SCORE: 30
ADLS_ACUITY_SCORE: 35

## 2024-02-02 NOTE — ED TRIAGE NOTES
Arrives to ED with c/o upper abd pain that woke pt at 0200. Pain worse to RUQ. Radiates to back. Denies cardiac hx. Started on omeprazole for possible reflux. +N/V/D. Multiple episodes of diarrhea beginning yesterday. N/V x2 today. Took tylenol and pepto 1 hour PTA without relief of sx.      Triage Assessment (Adult)       Row Name 02/02/24 6668          Triage Assessment    Airway WDL WDL        Respiratory WDL    Respiratory WDL WDL        Skin Circulation/Temperature WDL    Skin Circulation/Temperature WDL WDL        Cardiac WDL    Cardiac WDL X;rhythm     Pulse Rate & Regularity tachycardic        Peripheral/Neurovascular WDL    Peripheral Neurovascular WDL WDL        Cognitive/Neuro/Behavioral WDL    Cognitive/Neuro/Behavioral WDL WDL

## 2024-02-02 NOTE — ED PROVIDER NOTES
EMERGENCY DEPARTMENT ENCOUNTER      NAME: El Booth  AGE: 38 year old female  YOB: 1985  MRN: [id number]  EVALUATION DATE & TIME: No admission date for patient encounter.    PCP: No Ref-Primary, Physician    ED PROVIDER: Grazyna Agrawal M.D.      CHIEF COMPLAINT     Chief Complaint   Patient presents with    Abdominal Pain    Nausea, Vomiting, & Diarrhea    Back Pain         FINAL IMPRESSION:     1. Generalized abdominal pain    2. Left ovarian cyst          MEDICAL DECISION MAKING:       Pertinent Labs & Imaging studies reviewed. (See chart for details)    38 year old female presents to the Emergency Department for evaluation of abdominal pain chest pain    History  Supplemental history from father  External Record(s) review as documented below    Exam  Nontoxic cooperative and pleasant looks like she does not feel well right upper quadrant epigastric tenderness palpation    Differential Diagnosis include but not limited to cholelithiasis PE dissection peptic ulcer disease ectopic perforation among others.      Vital Signs: Tachycardic  EKG: Sinus tachycardia poor anterior progression right axis deviation  Imaging: I independently interpreted CT chest no free air.Formal read by radiologist.  Home meds: Reviewed  ED meds: Zofran, Toradol  Fluids: Normal saline  Labs:  K 3.7  Cr 0.62  Wbc 9.8  Hgb 15.5  platelets 326    Clinical Impression and Decision Making    38-year-old female presents complaining of vomiting diarrhea and upper abdominal pain.    Non Toxic on exam reproducible pain without guarding or rebound.  Tachycardic.    EKG reveals right axis deviation no previous available for comparison.    CT chest done because CT is reviewed for exodeviation no PE no pneumonia no cholelithiasis no free air.  There is a cyst.  Pain is not located in the lower abdomen.    No evidence of COVID or influenza at this was done because of the vomiting or diarrhea.    Initially quite tachycardic after  fluid antiemetics and pain medication significantly improved.    On reevaluation benign abdominal exam she is still concerned about her gallbladder therefore we will ultrasound the gallbladder and the ovaries.  Signed out to Dr. Hernandez and pending these 2 tests.        In addition to the work out documented, I considered the following work up antibiotics no evidence of infection.           Medical Decision Making    History:  Supplemental history from: Documented in chart  External Record(s) reviewed: Documented in chart and Outpatient Record: Patient was see at [facility] clinic on 6/29/23.    Work Up:  Chart documentation includes differential considered and any EKGs or imaging independently interpreted by provider, where specified.  In additional to work up documented, I considered the following work up: Documented in chart, if applicable.    External consultation:  Discussion of management with another provider: Documented in chart, if applicable    Complicating factors:  Care impacted by chronic illness: Mental Health and Other: Ectopic pregnancy.   Care affected by social determinants of health: N/A    Disposition considerations: Admission considered. Patient was signed out to the oncoming physician, disposition pending.      Review of External Records  Hospital/Clinic: [facility].   Date:6/29/23    Refilled on Trazodone       External Consultation        ED COURSE     1:10 PM met with the patient to gather history and to perform my initial exam. We discussed plans for the ED course, including diagnostic testing and treatment. PPE worn: cloth mask.  2:54 PM reevaluated and updated  Signed out to Dr. Hernandez    At the conclusion of the encounter I discussed the results of all of the tests and the disposition. The questions were answered. The patient and her father acknowledged understanding and was agreeable with the care plan.         MEDICATIONS GIVEN IN THE  EMERGENCY:     Medications   famotidine (PEPCID) injection 20 mg (has no administration in time range)   sodium chloride 0.9% BOLUS 1,000 mL (1,000 mLs Intravenous $New Bag 2/2/24 1325)   ondansetron (ZOFRAN) injection 4 mg (4 mg Intravenous $Given 2/2/24 1325)   ketorolac (TORADOL) injection 15 mg (15 mg Intravenous $Given 2/2/24 1325)   iopamidol (ISOVUE-370) solution 90 mL (90 mLs Intravenous $Given 2/2/24 1408)   alum & mag hydroxide-simethicone (MAALOX) suspension 15 mL (15 mLs Oral $Given 2/2/24 1504)       NEW PRESCRIPTIONS STARTED AT TODAY'S ER VISIT     New Prescriptions    No medications on file          =================================================================    HPI     Patient information was obtained from: Patient     Use of : N/A       El Booth is a 38 year old female who presents by walk-in for evaluation of RUQ abdominal pain along with nausea, vomiting and diarrhea. The patient pesents to the ED with her dad.     The patient reports RUQ abdominal pain along with vomiting, diarrhea and back pain. She had a sand which from [facility] last night and started endorsing diarrhea afterward. The patient states the RUQ abdominal pain started after the diarrhea. She then vomited. Patient states she vomited 2 times this morning and currently endorses some running nose.     The patient still her her gallbladder and uterus intact. She mentioned having 4 ectopic pregnancy resulting in the removal of her tubes. Patient smoke. Denies any COVID symptoms. No other complaints at this time.           REVIEW OF SYSTEMS   Review of Systems   Gastrointestinal:  Positive for abdominal pain, diarrhea, nausea and vomiting.   All other systems reviewed and are negative.       PAST MEDICAL HISTORY:     Past Medical History:   Diagnosis Date    Anxiety     Chronic headache     Ectopic pregnancy        PAST SURGICAL HISTORY:     Past Surgical History:   Procedure Laterality Date    [place]  LAP,DIAGNOSTIC ABDOMEN N/A 6/8/2018    Procedure: LAPAROSCOPY LEFT SIDED SALPINGECTOMY EVACUATION HEMO PERITONEUM;  Surgeon: Clemencia Aleman DO;  Location: [facility] Main OR;  Service: Gynecology         CURRENT MEDICATIONS:   escitalopram (LEXAPRO) 5 MG tablet  traZODone (DESYREL) 50 MG tablet         ALLERGIES:   No Known Allergies    FAMILY HISTORY:     Family History   Problem Relation Age of Onset    Depression Mother     Depression Father     Hyperlipidemia Father        SOCIAL HISTORY:     Social History     Socioeconomic History    Marital status: Single   Tobacco Use    Smoking status: Former   Substance and Sexual Activity    Alcohol use: Yes    Drug use: Yes     Types: Oxycodone       VITALS:   /74   Pulse 88   Temp 98.5  F (36.9  C) (Temporal)   Resp 20   Ht 1.524 m (5')   Wt 59 kg (130 lb)   SpO2 97%   BMI 25.39 kg/m      PHYSICAL EXAM     Physical Exam  Vitals and nursing note reviewed. Exam conducted with a chaperone present.   Constitutional:       General: She is not in acute distress.     Appearance: She is well-developed and normal weight. She is not ill-appearing, toxic-appearing or diaphoretic.   Cardiovascular:      Rate and Rhythm: Tachycardia present.   Abdominal:      Tenderness: There is abdominal tenderness in the epigastric area.   Neurological:      Mental Status: She is alert.         Physical Exam   Constitutional: Appears in pain.     Head: Atraumatic.     Nose: Nose normal.     Mouth/Throat: Oropharynx is clear and moist.     Eyes: EOM are normal. Pupils are equal, round, and reactive to light.     Ears: Bilateral pearly white tympanic membranes.    Neck: Normal range of motion. Neck supple.     Cardiovascular: Normal rate, regular rhythm and normal heart sounds.      Pulmonary/Chest: Normal effort  and breath sounds normal.     Abdominal: RUQ epigastric tenderness to palpation.     Musculoskeletal: Normal range of motion.     Neurological: Moves upper and lower  extremities equally.    Lymphatics: no edema    : NA    Skin: Skin is warm and dry.     Psychiatric: Normal mood and affect. Behavior is normal.       LAB:     All pertinent labs reviewed and interpreted.  Labs Ordered and Resulted from Time of ED Arrival to Time of ED Departure   BASIC METABOLIC PANEL - Abnormal       Result Value    Sodium 136      Potassium 3.7      Chloride 102      Carbon Dioxide (CO2) 21 (*)     Anion Gap 13      Urea Nitrogen 7.8      Creatinine 0.62      GFR Estimate >90      Calcium 9.3      Glucose 131 (*)    HEPATIC FUNCTION PANEL - Normal    Protein Total 7.3      Albumin 4.2      Bilirubin Total 0.2      Alkaline Phosphatase 103      AST 15      ALT 12      Bilirubin Direct <0.20     LIPASE - Normal    Lipase 36     HCG QUALITATIVE PREGNANCY - Normal    hCG Serum Qualitative Negative     INFLUENZA A/B, RSV, & SARS-COV2 PCR - Normal    Influenza A PCR Negative      Influenza B PCR Negative      RSV PCR Negative      SARS CoV2 PCR Negative     TROPONIN T, HIGH SENSITIVITY - Normal    Troponin T, High Sensitivity <6     CBC WITH PLATELETS AND DIFFERENTIAL    WBC Count 9.8      RBC Count 4.75      Hemoglobin 15.5      Hematocrit 44.3      MCV 93      MCH 32.6      MCHC 35.0      RDW 13.1      Platelet Count 326      % Neutrophils 84      % Lymphocytes 12      % Monocytes 4      % Eosinophils 0      % Basophils 0      % Immature Granulocytes 0      NRBCs per 100 WBC 0      Absolute Neutrophils 8.1      Absolute Lymphocytes 1.2      Absolute Monocytes 0.4      Absolute Eosinophils 0.0      Absolute Basophils 0.0      Absolute Immature Granulocytes 0.0      Absolute NRBCs 0.0          RADIOLOGY:     Reviewed all pertinent imaging. Please see official radiology report.  CT Chest (PE) Abdomen Pelvis w Contrast   Final Result   IMPRESSION:   1.  No pulmonary embolus or CT evidence for source of symptoms.   2.  Left corpus luteal cyst. Left adnexal cyst measures 3.4 cm. No specific follow-up  required.      REFERENCE:   Management of Incidental Adnexal Findings on CT and MRI: A White Paper of the ACR Incidental Findings Committee. J Am Lachelle Radiol 2020; 17(2):248-254.      Premenopausal or <50 if status unkown:      Equal to or <5 cm: No further imaging.            US Abdomen Limited    (Results Pending)   US Pelvic Complete with Transvaginal    (Results Pending)        EKG:     EKG #1  Sinus tachycardia poor anterior progression right axis deviation    Time:[id number]    Ventricular rate 132 bmp  Axis RAD  WI interval 142 ms  QRS duration 68 ms  QT//417 ms    Compared to previous EKG on no previous available for comparison  I have independently reviewed and interpreted the EKG(s) documented above.      PROCEDURES:     Procedures      I, Palma Waldrop, am serving as a scribe to document services personally performed by Dr. Agrawal based on my observation and the provider's statements to me. I, Grazyna Agrawal MD attest that Palma Waldrop is acting in a scribe capacity, has observed my performance of the services and has documented them in accordance with my direction.    Grazyna Agrawal M.D.  Emergency Medicine  [facility] EMERGENCY ROOM  [address] [id number] [phone]  Dept: [phone]       Grazyna Agrawal MD  02/02/24 [id number]

## 2024-02-02 NOTE — ED NOTES
EMERGENCY DEPARTMENT PROGRESS NOTE         ED COURSE AND MEDICAL DECISION MAKING  Patient was signed out to me by Grazyna Agrawal M.D. at 3:00 PM.   5:03 PM I rechecked on the patient and updated them on lab results and the plan.    El Booth is a 38 year old female who presents chest pain. The patient reports RUQ abdominal pain along with vomiting, diarrhea and back pain. She had a sand which from [facility] last night and started endorsing diarrhea afterward. I am to follow up on ultrasound and recheck patient.    ED Course as of 02/02/24 1800   Fri Feb 02, 2024   1700 Patient having emesis.  I am reviewing her ultrasound images which do show some gallbladder sludge.  No obvious pericholecystic fluid but she is still symptomatic so we will continue treatment here for now.  I also did review her chart from her previous outpatient office visits going back over the course of the past 2 years.  No hydronephrosis seen on the r side.  Awaiting radiologist final read.   1712 Radiology does agree that there are gallstones present without obvious findings of cholecystitis but she continues to have pain nausea and vomiting so will need to stay in the hospital at least for observation and repeat labs tomorrow morning.  We discussed the hospital being fall and whether or not she would like us to look to transfer or stay here.  She would prefer to transfer if there is a bed available in a nearby hospital so I am putting in the request for that.   1758 Patient will be observation m/s admission to hospitalist with whom I have spoken.  They are okay with admission here to repeat labs in am since lipase is not elevated so at this time no ercp necessary.         Medications   morphine (PF) injection 4 mg (4 mg Intravenous $Given 2/2/24 1735)   sodium chloride 0.9 % infusion ( Intravenous $New Bag 2/2/24 1735)   nicotine Patch in Place ( Transdermal Patch in Place 2/2/24 1736)   nicotine (NICODERM CQ) 21 MG/24HR 24 hr  patch 1 patch (1 patch Transdermal $Patch/Med Applied 2/2/24 5418)   sodium chloride 0.9% BOLUS 1,000 mL (0 mLs Intravenous Stopped 2/2/24 1600)   ondansetron (ZOFRAN) injection 4 mg (4 mg Intravenous $Given 2/2/24 1325)   ketorolac (TORADOL) injection 15 mg (15 mg Intravenous $Given 2/2/24 1325)   iopamidol (ISOVUE-370) solution 90 mL (90 mLs Intravenous $Given 2/2/24 1408)   famotidine (PEPCID) injection 20 mg (20 mg Intravenous $Given 2/2/24 1505)   alum & mag hydroxide-simethicone (MAALOX) suspension 15 mL (15 mLs Oral $Given 2/2/24 1649)   ondansetron (ZOFRAN) injection 4 mg (4 mg Intravenous $Given 2/2/24 1713)     New Prescriptions    No medications on file       LAB  Pertinent labs results reviewed   Results for orders placed or performed during the hospital encounter of 02/02/24   CT Chest (PE) Abdomen Pelvis w Contrast     Status: None    Narrative    EXAM: CT CHEST PE ABDOMEN PELVIS W CONTRAST  LOCATION: [facility]  DATE: 2/2/2024    INDICATION: Right sided pain evaluate for PE. Right-sided chest and abdominal pain Tachycardic.  COMPARISON: CT abdomen and pelvis from 05/08/2021  TECHNIQUE: CT chest pulmonary angiogram and routine CT abdomen pelvis with IV contrast. Arterial phase through the chest and venous phase through the abdomen and pelvis. Multiplanar reformats and MIP reconstructions were performed. Dose reduction   techniques were used.   CONTRAST: 90ml Isovue 370    FINDINGS:  ANGIOGRAM CHEST: Pulmonary arteries are normal caliber and negative for pulmonary emboli. Thoracic aorta is negative for dissection.     LUNGS AND PLEURA: Lungs are clear. No effusions.    MEDIASTINUM/AXILLAE: Heart size is normal. No adenopathy.    CORONARY ARTERY CALCIFICATION: None.    HEPATOBILIARY: Normal.    PANCREAS: Normal.    SPLEEN: Normal.    ADRENAL GLANDS: Normal.    KIDNEYS/BLADDER: No significant mass, stone, or hydronephrosis.    BOWEL: No obstruction or inflammatory change. No  evidence of appendicitis.    LYMPH NODES: Normal.    VASCULATURE: Unremarkable.    PELVIC ORGANS: Left corpus luteal cyst. Left adnexal cyst measures 3.4 x 2.9 cm. Small amount of physiologic free fluid in the pelvis.    MUSCULOSKELETAL: No acute osseous findings.      Impression    IMPRESSION:  1.  No pulmonary embolus or CT evidence for source of symptoms.  2.  Left corpus luteal cyst. Left adnexal cyst measures 3.4 cm. No specific follow-up required.    REFERENCE:  Management of Incidental Adnexal Findings on CT and MRI: A White Paper of the ACR Incidental Findings Committee. J Am Lachelle Radiol 2020; 17(2):248-254.    Premenopausal or <50 if status unkown:    Equal to or <5 cm: No further imaging.       US Abdomen Limited     Status: None    Narrative    EXAM: US ABDOMEN LIMITED  LOCATION: [facility]  DATE: 2/2/2024    INDICATION: pain evaluate for cholelithiasis  COMPARISON: CT chest, abdomen and pelvis 02/02/2024  TECHNIQUE: Limited abdominal ultrasound.    FINDINGS:    GALLBLADDER: Multiple stones in the gallbladder. No wall thickening or pericholecystic fluid. Negative sonographic Cutler's sign.    BILE DUCTS: No biliary dilatation. The common duct measures 2 mm.    LIVER: Normal parenchyma with smooth contour. No focal mass.    RIGHT KIDNEY: No hydronephrosis.    PANCREAS: The visualized portions are normal.    No ascites.      Impression    IMPRESSION:  Gallstones. No evidence for acute cholecystitis.   US Pelvic Complete with Transvaginal     Status: None    Narrative    EXAM: US PELVIC TRANSABDOMINAL AND TRANSVAGINAL  LOCATION: [facility]  DATE: 2/2/2024    INDICATION: Pain. EVALUTE FOR OVARIAN TORSION  COMPARISON: None.  TECHNIQUE: Transabdominal scans were performed. Endovaginal ultrasound was performed to better visualize the adnexa.    FINDINGS:    UTERUS: 8.9 x 6.4 x 4.7 cm. Anteverted with normal parenchyma. Heterogeneous hypoechoic mass measuring  1.0 cm near the uterine fundus likely representing an intramural fibroid.    ENDOMETRIUM: 13 mm. Normal smooth endometrium.    RIGHT OVARY: 2.9 x 2.9 x 1.4 cm. Documented vascular flow.    LEFT OVARY: 4.0 x 4.9 x 2.9 cm. Documented vascular flow. Within the left ovary is a complex hypoechoic cyst measuring 3.2 x 3.0 x 2.7 cm. This demonstrates reticular/lacelike internal echoes with a sonographic appearance most compatible with a   hemorrhagic ovarian cyst.    No significant free fluid.      Impression    IMPRESSION:  1.  Approximately 3.2 cm left ovarian hemorrhagic cyst. Please see below for imaging recommendations.  2.  Approximately 1.0 cm intramural uterine fibroid.  3.  No evidence of ovarian torsion.      HEMORRHAGIC CYSTS:  Premenopausal women/early post-menopausal (<5 years from LMP):  <=5 cm: no follow up needed.   >5 cm: follow up in 6-12 weeks to ensure resolution    Late post-menopausal women (>5 years from LMP):  Any size should be considered neoplastic and surgical evaluation should be considered.    REFERENCE:  Management of Asymptomatic Ovarian and Other Adnexal Cysts Imaged at [place]: Society of Radiologists in Ultrasound Consensus Conference Statement. Radiology September 2010; [id number].    Simple Adnexal Cysts: SRU Consensus Conference Update on Follow-up and Reporting. Radiology September 2019. [phone].   [place] Draw     Status: None    Narrative    The following orders were created for panel order [place] Draw.  Procedure                               Abnormality         Status                     ---------                               -----------         ------                     Extra Blue Top Tube[[id number]]                              Final result               Extra Red Top Tube[[id number]]                                                          Extra Green Top (Lithium...[[id number]]                                                 Extra Purple Top Tube[[id number]]                                                        Extra Green Top (Lithium...[[id number]]                      Final result                 Please view results for these tests on the individual orders.   Extra Blue Top Tube     Status: None   Result Value Ref Range    Hold Specimen JIC    Extra Green Top (Lithium Heparin) ON ICE     Status: None   Result Value Ref Range    Hold Specimen     Basic metabolic panel     Status: Abnormal   Result Value Ref Range    Sodium 136 135 - 145 mmol/L    Potassium 3.7 3.4 - 5.3 mmol/L    Chloride 102 98 - 107 mmol/L    Carbon Dioxide (CO2) 21 (L) 22 - 29 mmol/L    Anion Gap 13 7 - 15 mmol/L    Urea Nitrogen 7.8 6.0 - 20.0 mg/dL    Creatinine 0.62 0.51 - 0.95 mg/dL    GFR Estimate >90 >60 mL/min/1.73m2    Calcium 9.3 8.6 - 10.0 mg/dL    Glucose 131 (H) 70 - 99 mg/dL   Hepatic function panel     Status: Normal   Result Value Ref Range    Protein Total 7.3 6.4 - 8.3 g/dL    Albumin 4.2 3.5 - 5.2 g/dL    Bilirubin Total 0.2 <=1.2 mg/dL    Alkaline Phosphatase 103 40 - 150 U/L    AST 15 0 - 45 U/L    ALT 12 0 - 50 U/L    Bilirubin Direct <0.20 0.00 - 0.30 mg/dL   Lipase     Status: Normal   Result Value Ref Range    Lipase 36 13 - 60 U/L   HCG QUALitative pregnancy (blood)     Status: Normal   Result Value Ref Range    hCG Serum Qualitative Negative Negative   CBC with platelets and differential     Status: None   Result Value Ref Range    WBC Count 9.8 4.0 - 11.0 10e3/uL    RBC Count 4.75 3.80 - 5.20 10e6/uL    Hemoglobin 15.5 11.7 - 15.7 g/dL    Hematocrit 44.3 35.0 - 47.0 %    MCV 93 78 - 100 fL    MCH 32.6 26.5 - 33.0 pg    MCHC 35.0 31.5 - 36.5 g/dL    RDW 13.1 10.0 - 15.0 %    Platelet Count 326 150 - 450 10e3/uL    % Neutrophils 84 %    % Lymphocytes 12 %    % Monocytes 4 %    % Eosinophils 0 %    % Basophils 0 %    % Immature Granulocytes 0 %    NRBCs per 100 WBC 0 <1 /100    Absolute Neutrophils 8.1 1.6 - 8.3 10e3/uL    Absolute Lymphocytes 1.2 0.8 - 5.3 10e3/uL    Absolute Monocytes 0.4 0.0  - 1.3 10e3/uL    Absolute Eosinophils 0.0 0.0 - 0.7 10e3/uL    Absolute Basophils 0.0 0.0 - 0.2 10e3/uL    Absolute Immature Granulocytes 0.0 <=0.4 10e3/uL    Absolute NRBCs 0.0 10e3/uL   Symptomatic Influenza A/B, RSV, & SARS-CoV2 PCR (COVID-19) Nasopharyngeal     Status: Normal    Specimen: Nasopharyngeal; Swab   Result Value Ref Range    Influenza A PCR Negative Negative    Influenza B PCR Negative Negative    RSV PCR Negative Negative    SARS CoV2 PCR Negative Negative    Narrative    Testing was performed using the Xpert Xpress CoV2/Flu/RSV Assay on the "[employer]"pert Instrument. This test should be ordered for the detection of SARS-CoV-2, influenza, and RSV viruses in individuals who meet clinical and/or epidemiological criteria. Test performance is unknown in asymptomatic patients. This test is for in vitro diagnostic use under the FDA EUA for laboratories certified under CLIA to perform high or moderate complexity testing. This test has not been FDA cleared or approved. A negative result does not rule out the presence of PCR inhibitors in the specimen or target RNA in concentration below the limit of detection for the assay. If only one viral target is positive but coinfection with multiple targets is suspected, the sample should be re-tested with another FDA cleared, approved, or authorized test, if coinfection would change clinical management. This test was validated by the [facility]. These laboratories are certified under the Clinical Laboratory Improvement Amendments of 1988 (CLIA-88) as qualified to perform high complexity laboratory testing.   Troponin T, High Sensitivity (now)     Status: Normal   Result Value Ref Range    Troponin T, High Sensitivity <6 <=14 ng/L   ECG 12-LEAD WITH MUSE ([facility])     Status: None   Result Value Ref Range    Systolic Blood Pressure  mmHg    Diastolic Blood Pressure  mmHg    Ventricular Rate 132 BPM    Atrial Rate 132 BPM    OR Interval 142 ms     QRS Duration 68 ms     ms    QTc 417 ms    P Axis 65 degrees    R AXIS 94 degrees    T Axis 21 degrees    Interpretation ECG       Sinus tachycardia  Possible Left atrial enlargement  Rightward axis  Borderline ECG  No previous ECGs available  Confirmed by SEE ED PROVIDER NOTE FOR, ECG INTERPRETATION (4000),  Caryn Kendall ([id number]) on 2/2/2024 5:20:09 PM     CBC with platelets + differential     Status: None    Narrative    The following orders were created for panel order CBC with platelets + differential.  Procedure                               Abnormality         Status                     ---------                               -----------         ------                     CBC with platelets and d...[[id number]]                      Final result                 Please view results for these tests on the individual orders.         RADIOLOGY    Pertinent imaging reviewed   Please see official radiology report.  US Abdomen Limited   Final Result   IMPRESSION:   Gallstones. No evidence for acute cholecystitis.      US Pelvic Complete with Transvaginal   Final Result   IMPRESSION:   1.  Approximately 3.2 cm left ovarian hemorrhagic cyst. Please see below for imaging recommendations.   2.  Approximately 1.0 cm intramural uterine fibroid.   3.  No evidence of ovarian torsion.         HEMORRHAGIC CYSTS:   Premenopausal women/early post-menopausal (<5 years from LMP):   <=5 cm: no follow up needed.    >5 cm: follow up in 6-12 weeks to ensure resolution      Late post-menopausal women (>5 years from LMP):   Any size should be considered neoplastic and surgical evaluation should be considered.      REFERENCE:   Management of Asymptomatic Ovarian and Other Adnexal Cysts Imaged at US: Society of Radiologists in Ultrasound Consensus Conference Statement. Radiology September 2010; [id number].      Simple Adnexal Cysts: SRU Consensus Conference Update on Follow-up and Reporting. Radiology September 2019.  [phone].      CT Chest (PE) Abdomen Pelvis w Contrast   Final Result   IMPRESSION:   1.  No pulmonary embolus or CT evidence for source of symptoms.   2.  Left corpus luteal cyst. Left adnexal cyst measures 3.4 cm. No specific follow-up required.      REFERENCE:   Management of Incidental Adnexal Findings on CT and MRI: A White Paper of the ACR Incidental Findings Committee. J Am Lachelle Radiol 2020; 17(2):248-254.      Premenopausal or <50 if status unkown:      Equal to or <5 cm: No further imaging.                FINAL IMPRESSION    1. Generalized abdominal pain    2. Left ovarian cyst    3. Biliary colic    4. Gallstones           The creation of this record is based on the scribe s observations of the work being performed by Ko Zurita and the provider s statements to them. This document has been checked and approved by MD Michelle Park MD  Emergency Medicine  [facility] EMERGENCY ROOM       Michelle Hernandez MD  02/02/24 1759       Michelle Hernandez MD  02/02/24 1800

## 2024-02-03 ENCOUNTER — ANESTHESIA (OUTPATIENT)
Dept: SURGERY | Facility: CLINIC | Age: 39
End: 2024-02-03
Payer: COMMERCIAL

## 2024-02-03 ENCOUNTER — ANESTHESIA EVENT (OUTPATIENT)
Dept: SURGERY | Facility: CLINIC | Age: 39
End: 2024-02-03
Payer: COMMERCIAL

## 2024-02-03 VITALS
OXYGEN SATURATION: 95 % | WEIGHT: 130 LBS | HEART RATE: 100 BPM | HEIGHT: 60 IN | SYSTOLIC BLOOD PRESSURE: 100 MMHG | TEMPERATURE: 98.5 F | BODY MASS INDEX: 25.52 KG/M2 | DIASTOLIC BLOOD PRESSURE: 62 MMHG | RESPIRATION RATE: 18 BRPM

## 2024-02-03 LAB
ALBUMIN SERPL BCG-MCNC: 3.5 G/DL (ref 3.5–5.2)
ALP SERPL-CCNC: 68 U/L (ref 40–150)
ALT SERPL W P-5'-P-CCNC: <5 U/L (ref 0–50)
ANION GAP SERPL CALCULATED.3IONS-SCNC: 5 MMOL/L (ref 7–15)
AST SERPL W P-5'-P-CCNC: 15 U/L (ref 0–45)
BILIRUB DIRECT SERPL-MCNC: <0.2 MG/DL (ref 0–0.3)
BILIRUB SERPL-MCNC: <0.2 MG/DL
BUN SERPL-MCNC: 4.9 MG/DL (ref 6–20)
CALCIUM SERPL-MCNC: 8.6 MG/DL (ref 8.6–10)
CHLORIDE SERPL-SCNC: 110 MMOL/L (ref 98–107)
CREAT SERPL-MCNC: 0.62 MG/DL (ref 0.51–0.95)
DEPRECATED HCO3 PLAS-SCNC: 24 MMOL/L (ref 22–29)
EGFRCR SERPLBLD CKD-EPI 2021: >90 ML/MIN/1.73M2
ERYTHROCYTE [DISTWIDTH] IN BLOOD BY AUTOMATED COUNT: 13.1 % (ref 10–15)
GLUCOSE SERPL-MCNC: 96 MG/DL (ref 70–99)
HCT VFR BLD AUTO: 37 % (ref 35–47)
HGB BLD-MCNC: 12.7 G/DL (ref 11.7–15.7)
LIPASE SERPL-CCNC: 33 U/L (ref 13–60)
MCH RBC QN AUTO: 32.2 PG (ref 26.5–33)
MCHC RBC AUTO-ENTMCNC: 34.3 G/DL (ref 31.5–36.5)
MCV RBC AUTO: 94 FL (ref 78–100)
PLATELET # BLD AUTO: 246 10E3/UL (ref 150–450)
POTASSIUM SERPL-SCNC: 3.6 MMOL/L (ref 3.4–5.3)
PROT SERPL-MCNC: 5.8 G/DL (ref 6.4–8.3)
RBC # BLD AUTO: 3.95 10E6/UL (ref 3.8–5.2)
SODIUM SERPL-SCNC: 139 MMOL/L (ref 135–145)
WBC # BLD AUTO: 7.1 10E3/UL (ref 4–11)

## 2024-02-03 PROCEDURE — 80053 COMPREHEN METABOLIC PANEL: CPT | Performed by: STUDENT IN AN ORGANIZED HEALTH CARE EDUCATION/TRAINING PROGRAM

## 2024-02-03 PROCEDURE — 88304 TISSUE EXAM BY PATHOLOGIST: CPT | Mod: TC | Performed by: SURGERY

## 2024-02-03 PROCEDURE — 250N000011 HC RX IP 250 OP 636: Performed by: STUDENT IN AN ORGANIZED HEALTH CARE EDUCATION/TRAINING PROGRAM

## 2024-02-03 PROCEDURE — 250N000013 HC RX MED GY IP 250 OP 250 PS 637: Performed by: STUDENT IN AN ORGANIZED HEALTH CARE EDUCATION/TRAINING PROGRAM

## 2024-02-03 PROCEDURE — 250N000011 HC RX IP 250 OP 636: Performed by: SURGERY

## 2024-02-03 PROCEDURE — 272N000001 HC OR GENERAL SUPPLY STERILE: Performed by: SURGERY

## 2024-02-03 PROCEDURE — 83690 ASSAY OF LIPASE: CPT | Performed by: STUDENT IN AN ORGANIZED HEALTH CARE EDUCATION/TRAINING PROGRAM

## 2024-02-03 PROCEDURE — G0378 HOSPITAL OBSERVATION PER HR: HCPCS

## 2024-02-03 PROCEDURE — 360N000080 HC SURGERY LEVEL 7, PER MIN: Performed by: SURGERY

## 2024-02-03 PROCEDURE — 370N000017 HC ANESTHESIA TECHNICAL FEE, PER MIN: Performed by: SURGERY

## 2024-02-03 PROCEDURE — 250N000011 HC RX IP 250 OP 636: Performed by: EMERGENCY MEDICINE

## 2024-02-03 PROCEDURE — 250N000011 HC RX IP 250 OP 636: Performed by: ANESTHESIOLOGY

## 2024-02-03 PROCEDURE — 99204 OFFICE O/P NEW MOD 45 MIN: CPT | Mod: FS | Performed by: SURGERY

## 2024-02-03 PROCEDURE — 250N000009 HC RX 250: Performed by: SURGERY

## 2024-02-03 PROCEDURE — 250N000013 HC RX MED GY IP 250 OP 250 PS 637: Performed by: EMERGENCY MEDICINE

## 2024-02-03 PROCEDURE — 85027 COMPLETE CBC AUTOMATED: CPT | Performed by: STUDENT IN AN ORGANIZED HEALTH CARE EDUCATION/TRAINING PROGRAM

## 2024-02-03 PROCEDURE — 258N000003 HC RX IP 258 OP 636: Performed by: ANESTHESIOLOGY

## 2024-02-03 PROCEDURE — 47563 LAPARO CHOLECYSTECTOMY/GRAPH: CPT | Performed by: SURGERY

## 2024-02-03 PROCEDURE — 36415 COLL VENOUS BLD VENIPUNCTURE: CPT | Performed by: STUDENT IN AN ORGANIZED HEALTH CARE EDUCATION/TRAINING PROGRAM

## 2024-02-03 PROCEDURE — 99233 SBSQ HOSP IP/OBS HIGH 50: CPT | Performed by: STUDENT IN AN ORGANIZED HEALTH CARE EDUCATION/TRAINING PROGRAM

## 2024-02-03 PROCEDURE — 710N000011 HC RECOVERY PHASE 1, LEVEL 3, PER MIN: Performed by: SURGERY

## 2024-02-03 PROCEDURE — 258N000003 HC RX IP 258 OP 636: Performed by: STUDENT IN AN ORGANIZED HEALTH CARE EDUCATION/TRAINING PROGRAM

## 2024-02-03 PROCEDURE — 250N000009 HC RX 250: Performed by: ANESTHESIOLOGY

## 2024-02-03 PROCEDURE — 710N000012 HC RECOVERY PHASE 2, PER MINUTE: Performed by: SURGERY

## 2024-02-03 PROCEDURE — 96376 TX/PRO/DX INJ SAME DRUG ADON: CPT

## 2024-02-03 PROCEDURE — 250N000025 HC SEVOFLURANE, PER MIN: Performed by: SURGERY

## 2024-02-03 PROCEDURE — 258N000003 HC RX IP 258 OP 636: Performed by: EMERGENCY MEDICINE

## 2024-02-03 PROCEDURE — 88304 TISSUE EXAM BY PATHOLOGIST: CPT | Mod: 26 | Performed by: PATHOLOGY

## 2024-02-03 RX ORDER — SODIUM CHLORIDE, SODIUM LACTATE, POTASSIUM CHLORIDE, CALCIUM CHLORIDE 600; 310; 30; 20 MG/100ML; MG/100ML; MG/100ML; MG/100ML
INJECTION, SOLUTION INTRAVENOUS CONTINUOUS
Status: DISCONTINUED | OUTPATIENT
Start: 2024-02-03 | End: 2024-02-03 | Stop reason: HOSPADM

## 2024-02-03 RX ORDER — LIDOCAINE HYDROCHLORIDE AND EPINEPHRINE 10; 10 MG/ML; UG/ML
INJECTION, SOLUTION INFILTRATION; PERINEURAL PRN
Status: DISCONTINUED | OUTPATIENT
Start: 2024-02-03 | End: 2024-02-03 | Stop reason: HOSPADM

## 2024-02-03 RX ORDER — FENTANYL CITRATE 50 UG/ML
50 INJECTION, SOLUTION INTRAMUSCULAR; INTRAVENOUS EVERY 5 MIN PRN
Status: DISCONTINUED | OUTPATIENT
Start: 2024-02-03 | End: 2024-02-03 | Stop reason: HOSPADM

## 2024-02-03 RX ORDER — HYDROCODONE BITARTRATE AND ACETAMINOPHEN 5; 325 MG/1; MG/1
1 TABLET ORAL EVERY 6 HOURS PRN
Qty: 10 TABLET | Refills: 0 | Status: SHIPPED | OUTPATIENT
Start: 2024-02-03 | End: 2024-02-06

## 2024-02-03 RX ORDER — PROPOFOL 10 MG/ML
INJECTION, EMULSION INTRAVENOUS CONTINUOUS PRN
Status: DISCONTINUED | OUTPATIENT
Start: 2024-02-03 | End: 2024-02-03

## 2024-02-03 RX ORDER — CEFAZOLIN SODIUM/WATER 2 G/20 ML
2 SYRINGE (ML) INTRAVENOUS
Status: COMPLETED | OUTPATIENT
Start: 2024-02-03 | End: 2024-02-03

## 2024-02-03 RX ORDER — FENTANYL CITRATE 50 UG/ML
INJECTION, SOLUTION INTRAMUSCULAR; INTRAVENOUS PRN
Status: DISCONTINUED | OUTPATIENT
Start: 2024-02-03 | End: 2024-02-03

## 2024-02-03 RX ORDER — INDOCYANINE GREEN AND WATER 25 MG
2.5 KIT INJECTION ONCE
Qty: 25 MG | Refills: 0 | Status: COMPLETED | OUTPATIENT
Start: 2024-02-03 | End: 2024-02-03

## 2024-02-03 RX ORDER — HALOPERIDOL 5 MG/ML
1 INJECTION INTRAMUSCULAR
Status: DISCONTINUED | OUTPATIENT
Start: 2024-02-03 | End: 2024-02-03 | Stop reason: HOSPADM

## 2024-02-03 RX ORDER — PROPOFOL 10 MG/ML
INJECTION, EMULSION INTRAVENOUS PRN
Status: DISCONTINUED | OUTPATIENT
Start: 2024-02-03 | End: 2024-02-03

## 2024-02-03 RX ORDER — KETOROLAC TROMETHAMINE 30 MG/ML
INJECTION, SOLUTION INTRAMUSCULAR; INTRAVENOUS PRN
Status: DISCONTINUED | OUTPATIENT
Start: 2024-02-03 | End: 2024-02-03

## 2024-02-03 RX ORDER — MEPERIDINE HYDROCHLORIDE 25 MG/ML
12.5 INJECTION INTRAMUSCULAR; INTRAVENOUS; SUBCUTANEOUS EVERY 5 MIN PRN
Status: DISCONTINUED | OUTPATIENT
Start: 2024-02-03 | End: 2024-02-03 | Stop reason: HOSPADM

## 2024-02-03 RX ORDER — ONDANSETRON 4 MG/1
4 TABLET, ORALLY DISINTEGRATING ORAL EVERY 30 MIN PRN
Status: DISCONTINUED | OUTPATIENT
Start: 2024-02-03 | End: 2024-02-03 | Stop reason: HOSPADM

## 2024-02-03 RX ORDER — LIDOCAINE HYDROCHLORIDE 10 MG/ML
INJECTION, SOLUTION INFILTRATION; PERINEURAL PRN
Status: DISCONTINUED | OUTPATIENT
Start: 2024-02-03 | End: 2024-02-03

## 2024-02-03 RX ORDER — FENTANYL CITRATE 50 UG/ML
25 INJECTION, SOLUTION INTRAMUSCULAR; INTRAVENOUS EVERY 5 MIN PRN
Status: DISCONTINUED | OUTPATIENT
Start: 2024-02-03 | End: 2024-02-03 | Stop reason: HOSPADM

## 2024-02-03 RX ORDER — ONDANSETRON 2 MG/ML
INJECTION INTRAMUSCULAR; INTRAVENOUS PRN
Status: DISCONTINUED | OUTPATIENT
Start: 2024-02-03 | End: 2024-02-03

## 2024-02-03 RX ORDER — DOCUSATE SODIUM 100 MG/1
100 CAPSULE, LIQUID FILLED ORAL 2 TIMES DAILY
Qty: 30 CAPSULE | Refills: 0 | Status: SHIPPED | OUTPATIENT
Start: 2024-02-03

## 2024-02-03 RX ORDER — LIDOCAINE 40 MG/G
CREAM TOPICAL
Status: DISCONTINUED | OUTPATIENT
Start: 2024-02-03 | End: 2024-02-03 | Stop reason: HOSPADM

## 2024-02-03 RX ORDER — DEXAMETHASONE SODIUM PHOSPHATE 10 MG/ML
INJECTION, SOLUTION INTRAMUSCULAR; INTRAVENOUS PRN
Status: DISCONTINUED | OUTPATIENT
Start: 2024-02-03 | End: 2024-02-03

## 2024-02-03 RX ORDER — HYDROMORPHONE HCL IN WATER/PF 6 MG/30 ML
0.4 PATIENT CONTROLLED ANALGESIA SYRINGE INTRAVENOUS EVERY 5 MIN PRN
Status: DISCONTINUED | OUTPATIENT
Start: 2024-02-03 | End: 2024-02-03 | Stop reason: HOSPADM

## 2024-02-03 RX ORDER — CEFAZOLIN SODIUM/WATER 2 G/20 ML
2 SYRINGE (ML) INTRAVENOUS SEE ADMIN INSTRUCTIONS
Status: DISCONTINUED | OUTPATIENT
Start: 2024-02-03 | End: 2024-02-03 | Stop reason: HOSPADM

## 2024-02-03 RX ORDER — DIPHENHYDRAMINE HYDROCHLORIDE 50 MG/ML
INJECTION INTRAMUSCULAR; INTRAVENOUS PRN
Status: DISCONTINUED | OUTPATIENT
Start: 2024-02-03 | End: 2024-02-03

## 2024-02-03 RX ORDER — OXYCODONE HYDROCHLORIDE 5 MG/1
5 TABLET ORAL EVERY 4 HOURS PRN
Status: DISCONTINUED | OUTPATIENT
Start: 2024-02-03 | End: 2024-02-03 | Stop reason: HOSPADM

## 2024-02-03 RX ORDER — HYDROMORPHONE HCL IN WATER/PF 6 MG/30 ML
0.2 PATIENT CONTROLLED ANALGESIA SYRINGE INTRAVENOUS EVERY 5 MIN PRN
Status: DISCONTINUED | OUTPATIENT
Start: 2024-02-03 | End: 2024-02-03 | Stop reason: HOSPADM

## 2024-02-03 RX ORDER — SODIUM CHLORIDE, SODIUM LACTATE, POTASSIUM CHLORIDE, CALCIUM CHLORIDE 600; 310; 30; 20 MG/100ML; MG/100ML; MG/100ML; MG/100ML
INJECTION, SOLUTION INTRAVENOUS CONTINUOUS PRN
Status: DISCONTINUED | OUTPATIENT
Start: 2024-02-03 | End: 2024-02-03

## 2024-02-03 RX ORDER — CEFAZOLIN SODIUM/WATER 2 G/20 ML
SYRINGE (ML) INTRAVENOUS
Status: DISCONTINUED
Start: 2024-02-03 | End: 2024-02-03 | Stop reason: HOSPADM

## 2024-02-03 RX ORDER — ONDANSETRON 2 MG/ML
4 INJECTION INTRAMUSCULAR; INTRAVENOUS EVERY 30 MIN PRN
Status: DISCONTINUED | OUTPATIENT
Start: 2024-02-03 | End: 2024-02-03 | Stop reason: HOSPADM

## 2024-02-03 RX ADMIN — MORPHINE SULFATE 4 MG: 4 INJECTION, SOLUTION INTRAMUSCULAR; INTRAVENOUS at 06:35

## 2024-02-03 RX ADMIN — DEXMEDETOMIDINE HYDROCHLORIDE 12 MCG: 100 INJECTION, SOLUTION INTRAVENOUS at 14:49

## 2024-02-03 RX ADMIN — HYDROMORPHONE HYDROCHLORIDE 0.4 MG: 0.2 INJECTION, SOLUTION INTRAMUSCULAR; INTRAVENOUS; SUBCUTANEOUS at 15:52

## 2024-02-03 RX ADMIN — PANTOPRAZOLE SODIUM 40 MG: 40 TABLET, DELAYED RELEASE ORAL at 09:48

## 2024-02-03 RX ADMIN — ROCURONIUM BROMIDE 20 MG: 10 INJECTION, SOLUTION INTRAVENOUS at 14:45

## 2024-02-03 RX ADMIN — PROPOFOL 200 MG: 10 INJECTION, EMULSION INTRAVENOUS at 14:02

## 2024-02-03 RX ADMIN — OXYCODONE HYDROCHLORIDE 5 MG: 5 TABLET ORAL at 09:48

## 2024-02-03 RX ADMIN — SODIUM CHLORIDE, POTASSIUM CHLORIDE, SODIUM LACTATE AND CALCIUM CHLORIDE: 600; 310; 30; 20 INJECTION, SOLUTION INTRAVENOUS at 13:52

## 2024-02-03 RX ADMIN — SODIUM CHLORIDE, POTASSIUM CHLORIDE, SODIUM LACTATE AND CALCIUM CHLORIDE: 600; 310; 30; 20 INJECTION, SOLUTION INTRAVENOUS at 15:01

## 2024-02-03 RX ADMIN — FENTANYL CITRATE 100 MCG: 50 INJECTION INTRAMUSCULAR; INTRAVENOUS at 14:02

## 2024-02-03 RX ADMIN — KETOROLAC TROMETHAMINE 15 MG: 30 INJECTION, SOLUTION INTRAMUSCULAR at 14:59

## 2024-02-03 RX ADMIN — HYDROMORPHONE HYDROCHLORIDE 0.5 MG: 1 INJECTION, SOLUTION INTRAMUSCULAR; INTRAVENOUS; SUBCUTANEOUS at 14:48

## 2024-02-03 RX ADMIN — MIDAZOLAM 2 MG: 1 INJECTION INTRAMUSCULAR; INTRAVENOUS at 13:52

## 2024-02-03 RX ADMIN — SODIUM CHLORIDE: 9 INJECTION, SOLUTION INTRAVENOUS at 00:46

## 2024-02-03 RX ADMIN — KETOROLAC TROMETHAMINE 15 MG: 15 INJECTION, SOLUTION INTRAMUSCULAR; INTRAVENOUS at 04:43

## 2024-02-03 RX ADMIN — DIPHENHYDRAMINE HYDROCHLORIDE 25 MG: 50 INJECTION, SOLUTION INTRAMUSCULAR; INTRAVENOUS at 13:52

## 2024-02-03 RX ADMIN — INDOCYANINE GREEN AND WATER 2.5 MG: KIT at 13:58

## 2024-02-03 RX ADMIN — DEXAMETHASONE SODIUM PHOSPHATE 10 MG: 10 INJECTION, SOLUTION INTRAMUSCULAR; INTRAVENOUS at 14:19

## 2024-02-03 RX ADMIN — LIDOCAINE HYDROCHLORIDE 5 ML: 10 INJECTION, SOLUTION INFILTRATION; PERINEURAL at 14:02

## 2024-02-03 RX ADMIN — ROCURONIUM BROMIDE 50 MG: 10 INJECTION, SOLUTION INTRAVENOUS at 14:02

## 2024-02-03 RX ADMIN — ACETAMINOPHEN 650 MG: 325 TABLET ORAL at 06:08

## 2024-02-03 RX ADMIN — DEXMEDETOMIDINE HYDROCHLORIDE 8 MCG: 100 INJECTION, SOLUTION INTRAVENOUS at 15:04

## 2024-02-03 RX ADMIN — ONDANSETRON 4 MG: 2 INJECTION INTRAMUSCULAR; INTRAVENOUS at 13:52

## 2024-02-03 RX ADMIN — HYDROMORPHONE HYDROCHLORIDE 0.5 MG: 1 INJECTION, SOLUTION INTRAMUSCULAR; INTRAVENOUS; SUBCUTANEOUS at 14:18

## 2024-02-03 RX ADMIN — MORPHINE SULFATE 4 MG: 4 INJECTION, SOLUTION INTRAMUSCULAR; INTRAVENOUS at 00:46

## 2024-02-03 RX ADMIN — NICOTINE 1 PATCH: 21 PATCH, EXTENDED RELEASE TRANSDERMAL at 09:48

## 2024-02-03 RX ADMIN — SODIUM CHLORIDE 1000 ML: 9 INJECTION, SOLUTION INTRAVENOUS at 09:49

## 2024-02-03 RX ADMIN — PROPOFOL 50 MCG/KG/MIN: 10 INJECTION, EMULSION INTRAVENOUS at 14:09

## 2024-02-03 RX ADMIN — Medication 2 G: at 13:52

## 2024-02-03 RX ADMIN — HYDROMORPHONE HYDROCHLORIDE 0.4 MG: 0.2 INJECTION, SOLUTION INTRAMUSCULAR; INTRAVENOUS; SUBCUTANEOUS at 16:01

## 2024-02-03 RX ADMIN — FENTANYL CITRATE 50 MCG: 50 INJECTION INTRAMUSCULAR; INTRAVENOUS at 15:36

## 2024-02-03 RX ADMIN — FENTANYL CITRATE 50 MCG: 50 INJECTION INTRAMUSCULAR; INTRAVENOUS at 15:42

## 2024-02-03 RX ADMIN — SUGAMMADEX 150 MG: 100 INJECTION, SOLUTION INTRAVENOUS at 15:02

## 2024-02-03 RX ADMIN — SODIUM CHLORIDE 500 ML: 9 INJECTION, SOLUTION INTRAVENOUS at 08:36

## 2024-02-03 ASSESSMENT — ACTIVITIES OF DAILY LIVING (ADL)
ADLS_ACUITY_SCORE: 30
ADLS_ACUITY_SCORE: 28
ADLS_ACUITY_SCORE: 28
ADLS_ACUITY_SCORE: 30

## 2024-02-03 ASSESSMENT — LIFESTYLE VARIABLES: TOBACCO_USE: 1

## 2024-02-03 NOTE — PROGRESS NOTES
[facility]    Medicine Progress Note - Hospitalist Service    Date of Admission:  2/2/2024    Assessment & Plan      El Booth is a 38 year old female admitted on 2/2/2024. She has a past medical history of depression, reflux, insomnia, who presents with sudden onset right upper quadrant pain.    On admission is vitally stable, notable labs include normal CMP including normal transaminases, normal CBC including hemoglobin 15.5, negative for COVID influenza RSV, CT PE study was negative and no PE but does note a 3.4 cm left adnexal cyst-no specific follow-up required (discussed with patient may have the option of surveillance interval monitoring with outpatient provider), follow-up ultrasound demonstrates 3.2 cm left ovarian hemorrhagic cyst-this is not a ruptured cyst and there is no torsion; monitoring clinically; ultrasound abdomen demonstrates no acute findings but does note gallstones.  On admission, right upper quadrant pain is 8 out of 10 in severity.  We discussed potential for HIDA scan and or surgical consultation if her pain is progressive.      Pain worsened overnight, now 8/10 in severity. Ordered HIDA scan. Pain intractable and pt wondering about elective surgery; General Surgery consulted.       -----  RUQ pain  Nausea with nonbloody emesis, diarrhea day prior  Gallstones  Left ovarian hemorrhagic cyst- asymptomatic no lower abdominal or pelvic pain  A- as above. Ordered hida and pt remains npo. However, she requested surgical input sooner rather than later, so discussed with bedside team and placed consult order.    Regarding the cyst, it is hemorrhagic and appears asymptomatic so we will clinically monitor for now, no torsion or ruptured cyst noted -if her pelvis or lower abdomen develops any pain or symptoms would consider interval imaging and may require surgical input if there is a rupture/torsion.  Discussed with her interval follow-up scan within 3 or so  months.  P:  - pain control- ibuprofen and toradol, for severe will have oxycodone   - increased dose given refractory pain  - nausea control zofran and compazine  - npo   - hida scan 2/3/2024   - pt requested input sooner rather than later and even opts for elective lap donell if possible  - consult General Surgery   - monitor for lower abdominal or pelvic symptoms--> if so would repeat US of pelvis     Reflux  A/p- appears stable, continue home meds    Depression  Insomnia  A/p- stable, continue home meds, add on melatonin prn        Observation Goals: -diagnostic tests and consults completed and resulted, -vital signs normal or at patient baseline, -adequate pain control on oral analgesics, Nurse to notify provider when observation goals have been met and patient is ready for discharge.  Diet: NPO per Anesthesia Guidelines for Procedure/Surgery Except for: Meds    DVT Prophylaxis: Pneumatic Compression Devices  Leone Catheter: Not present  Lines: None     Cardiac Monitoring: None  Code Status: Full Code      Clinically Significant Risk Factors Present on Admission                        # Overweight: Estimated body mass index is 25.39 kg/m  as calculated from the following:    Height as of this encounter: 1.524 m (5').    Weight as of this encounter: 59 kg (130 lb).              Disposition Plan      Expected Discharge Date: 02/04/2024        Discharge Comments: NPO for possible procedure today 2/3            Bruno Roberts MD  Hospitalist Service  [facility]  Securely message with Key Ring (more info)  Text page via [facility] Paging/Directory   ______________________________________________________________________    Interval History   Pain worsened overnight, now 8/10 in severity  Pt has no new nausea  RUQ pain same location  Discussed hida and remains npo  However, she requested surgical input sooner rather than later, so discussed with bedside team and placed consult order  Pt stated she  would be considering an elective procedure if possible  Discussed her symptomatic gallstones  Noted labs are stable   Discussed w/ RN     Physical Exam   Vital Signs: Temp: 98.5  F (36.9  C) Temp src: Oral BP: 93/52 Pulse: 80   Resp: 16 SpO2: 94 % O2 Device: None (Room air)    Weight: 130 lbs 0 oz    General: alert, oriented, and in no acute distress  Pulmonary: clear to auscultation bilaterally, normal respiratory effort, on room air, no rales or wheezes or evidence of accessory muscle use  CVS: regular rate and rhythm, no murmurs, rubs, or gallops; no blatant jugular venous distention; no extremity edema and extremities are warm to the touch  GI: remains soft, Ruq pain is severe on both gentle and deeper palpation, positive gay's sign but remains soft overall and no rebound or guarding, no lower abdominal or suprapubic pain   Neuro: nonfocal, moves all extremities of own volition  Psych: appropriate        Medical Decision Making       55 MINUTES SPENT BY ME on the date of service doing chart review, history, exam, documentation & further activities per the note.      Data   ------------------------- PAST 24 HR DATA REVIEWED -----------------------------------------------    I have personally reviewed the following data over the past 24 hrs:    7.1  \   12.7   / 246     139 110 (H) 4.9 (L) /  96   3.6 24 0.62 \     ALT: <5 AST: 15 AP: 68 TBILI: <0.2   ALB: 3.5 TOT PROTEIN: 5.8 (L) LIPASE: 33     Trop: <6 BNP: N/A       Imaging results reviewed over the past 24 hrs:   Recent Results (from the past 24 hour(s))   CT Chest (PE) Abdomen Pelvis w Contrast    Narrative    EXAM: CT CHEST PE ABDOMEN PELVIS W CONTRAST  LOCATION: [facility]  DATE: 2/2/2024    INDICATION: Right sided pain evaluate for PE. Right-sided chest and abdominal pain Tachycardic.  COMPARISON: CT abdomen and pelvis from 05/08/2021  TECHNIQUE: CT chest pulmonary angiogram and routine CT abdomen pelvis with IV contrast.  Arterial phase through the chest and venous phase through the abdomen and pelvis. Multiplanar reformats and MIP reconstructions were performed. Dose reduction   techniques were used.   CONTRAST: 90ml Isovue 370    FINDINGS:  ANGIOGRAM CHEST: Pulmonary arteries are normal caliber and negative for pulmonary emboli. Thoracic aorta is negative for dissection.     LUNGS AND PLEURA: Lungs are clear. No effusions.    MEDIASTINUM/AXILLAE: Heart size is normal. No adenopathy.    CORONARY ARTERY CALCIFICATION: None.    HEPATOBILIARY: Normal.    PANCREAS: Normal.    SPLEEN: Normal.    ADRENAL GLANDS: Normal.    KIDNEYS/BLADDER: No significant mass, stone, or hydronephrosis.    BOWEL: No obstruction or inflammatory change. No evidence of appendicitis.    LYMPH NODES: Normal.    VASCULATURE: Unremarkable.    PELVIC ORGANS: Left corpus luteal cyst. Left adnexal cyst measures 3.4 x 2.9 cm. Small amount of physiologic free fluid in the pelvis.    MUSCULOSKELETAL: No acute osseous findings.      Impression    IMPRESSION:  1.  No pulmonary embolus or CT evidence for source of symptoms.  2.  Left corpus luteal cyst. Left adnexal cyst measures 3.4 cm. No specific follow-up required.    REFERENCE:  Management of Incidental Adnexal Findings on CT and MRI: A White Paper of the ACR Incidental Findings Committee. J Am Lachelle Radiol 2020; 17(2):248-254.    Premenopausal or <50 if status unkown:    Equal to or <5 cm: No further imaging.       US Pelvic Complete with Transvaginal    Narrative    EXAM: US PELVIC TRANSABDOMINAL AND TRANSVAGINAL  LOCATION: [facility]  DATE: 2/2/2024    INDICATION: Pain. EVALUTE FOR OVARIAN TORSION  COMPARISON: None.  TECHNIQUE: Transabdominal scans were performed. Endovaginal ultrasound was performed to better visualize the adnexa.    FINDINGS:    UTERUS: 8.9 x 6.4 x 4.7 cm. Anteverted with normal parenchyma. Heterogeneous hypoechoic mass measuring 1.0 cm near the uterine fundus likely  representing an intramural fibroid.    ENDOMETRIUM: 13 mm. Normal smooth endometrium.    RIGHT OVARY: 2.9 x 2.9 x 1.4 cm. Documented vascular flow.    LEFT OVARY: 4.0 x 4.9 x 2.9 cm. Documented vascular flow. Within the left ovary is a complex hypoechoic cyst measuring 3.2 x 3.0 x 2.7 cm. This demonstrates reticular/lacelike internal echoes with a sonographic appearance most compatible with a   hemorrhagic ovarian cyst.    No significant free fluid.      Impression    IMPRESSION:  1.  Approximately 3.2 cm left ovarian hemorrhagic cyst. Please see below for imaging recommendations.  2.  Approximately 1.0 cm intramural uterine fibroid.  3.  No evidence of ovarian torsion.      HEMORRHAGIC CYSTS:  Premenopausal women/early post-menopausal (<5 years from LMP):  <=5 cm: no follow up needed.   >5 cm: follow up in 6-12 weeks to ensure resolution    Late post-menopausal women (>5 years from LMP):  Any size should be considered neoplastic and surgical evaluation should be considered.    REFERENCE:  Management of Asymptomatic Ovarian and Other Adnexal Cysts Imaged at US: Society of Radiologists in Ultrasound Consensus Conference Statement. Radiology September 2010; [id number].    Simple Adnexal Cysts: SRU Consensus Conference Update on Follow-up and Reporting. Radiology September 2019. [id number].   US Abdomen Limited    Narrative    EXAM: US ABDOMEN LIMITED  LOCATION: [facility]  DATE: 2/2/2024    INDICATION: pain evaluate for cholelithiasis  COMPARISON: CT chest, abdomen and pelvis 02/02/2024  TECHNIQUE: Limited abdominal ultrasound.    FINDINGS:    GALLBLADDER: Multiple stones in the gallbladder. No wall thickening or pericholecystic fluid. Negative sonographic Cutler's sign.    BILE DUCTS: No biliary dilatation. The common duct measures 2 mm.    LIVER: Normal parenchyma with smooth contour. No focal mass.    RIGHT KIDNEY: No hydronephrosis.    PANCREAS: The visualized portions are normal.    No  ascites.      Impression    IMPRESSION:  Gallstones. No evidence for acute cholecystitis.

## 2024-02-03 NOTE — ANESTHESIA PREPROCEDURE EVALUATION
Anesthesia Pre-Procedure Evaluation    Patient: El Booth   MRN: [id number] : 1985        Procedure : Procedure(s):  CHOLECYSTECTOMY, ROBOT-ASSISTED, LAPAROSCOPIC, USING DA CHAD XI          Past Medical History:   Diagnosis Date    Anxiety     Chronic headache     Ectopic pregnancy       Past Surgical History:   Procedure Laterality Date    IL LAP,DIAGNOSTIC ABDOMEN N/A 2018    Procedure: LAPAROSCOPY LEFT SIDED SALPINGECTOMY EVACUATION HEMO PERITONEUM;  Surgeon: Clemencia Aleman DO;  Location: [facility] OR;  Service: Gynecology      No Known Allergies   Social History     Tobacco Use    Smoking status: Former    Smokeless tobacco: Not on file   Substance Use Topics    Alcohol use: Yes      Wt Readings from Last 1 Encounters:   24 59 kg (130 lb)        Anesthesia Evaluation   Pt has had prior anesthetic. Type: General.    No history of anesthetic complications       ROS/MED HX  ENT/Pulmonary:  - neg pulmonary ROS   (+)                tobacco use, Current use,                       Neurologic:  - neg neurologic ROS   (+)      migraines,                          Cardiovascular:  - neg cardiovascular ROS     METS/Exercise Tolerance: >4 METS    Hematologic:  - neg hematologic  ROS     Musculoskeletal:  - neg musculoskeletal ROS     GI/Hepatic: Comment: Denies n/v since hospital admission - neg GI/hepatic ROS   (+) GERD, Asymptomatic on medication,                  Renal/Genitourinary:  - neg Renal ROS     Endo:  - neg endo ROS     Psychiatric/Substance Use:  - neg psychiatric ROS   (+) psychiatric history anxiety       Infectious Disease:  - neg infectious disease ROS     Malignancy:  - neg malignancy ROS     Other:  - neg other ROS          Physical Exam    Airway        Mallampati: II   TM distance: > 3 FB   Neck ROM: full   Mouth opening: > 3 cm    Respiratory Devices and Support         Dental       (+) Minor Abnormalities - some fillings, tiny chips    B=Bridge, C=Chipped,  L=Loose, M=Missing    Cardiovascular   cardiovascular exam normal          Pulmonary   pulmonary exam normal                OUTSIDE LABS:  CBC:   Lab Results   Component Value Date    WBC 7.1 02/03/2024    WBC 9.8 02/02/2024    HGB 12.7 02/03/2024    HGB 15.5 02/02/2024    HCT 37.0 02/03/2024    HCT 44.3 02/02/2024     02/03/2024     02/02/2024     BMP:   Lab Results   Component Value Date     02/03/2024     02/02/2024    POTASSIUM 3.6 02/03/2024    POTASSIUM 3.7 02/02/2024    CHLORIDE 110 (H) 02/03/2024    CHLORIDE 102 02/02/2024    CO2 24 02/03/2024    CO2 21 (L) 02/02/2024    BUN 4.9 (L) 02/03/2024    BUN 7.8 02/02/2024    CR 0.62 02/03/2024    CR 0.62 02/02/2024    GLC 96 02/03/2024     (H) 02/02/2024     COAGS:   Lab Results   Component Value Date    PTT 30 06/07/2018    INR 0.95 06/07/2018     POC:   Lab Results   Component Value Date    HCGS Negative 02/02/2024     HEPATIC:   Lab Results   Component Value Date    ALBUMIN 3.5 02/03/2024    PROTTOTAL 5.8 (L) 02/03/2024    ALT <5 02/03/2024    AST 15 02/03/2024    ALKPHOS 68 02/03/2024    BILITOTAL <0.2 02/03/2024     OTHER:   Lab Results   Component Value Date    LACT 0.5 06/23/2020    AYESHA 8.6 02/03/2024    LIPASE 33 02/03/2024       Anesthesia Plan    ASA Status:  2, emergent    NPO Status:  NPO Appropriate    Anesthesia Type: General.     - Airway: ETT   Induction: Propofol, Intravenous.   Maintenance: Balanced.        Consents    Anesthesia Plan(s) and associated risks, benefits, and realistic alternatives discussed. Questions answered and patient/representative(s) expressed understanding.     - Discussed:     - Discussed with:  Patient            Postoperative Care    Pain management: Multi-modal analgesia.   PONV prophylaxis: Ondansetron (or other 5HT-3), Dexamethasone or Solumedrol     Comments:    Other Comments: Reviewed anesthetic options and risks, including risk of dental trauma. Patient agrees to proceed.     15 mg  toradol if OK with surgeon             Sinan Wolf MD    I have reviewed the pertinent notes and labs in the chart from the past 30 days and (re)examined the patient.  Any updates or changes from those notes are reflected in this note.              # Overweight: Estimated body mass index is 25.39 kg/m  as calculated from the following:    Height as of this encounter: 1.524 m (5').    Weight as of this encounter: 59 kg (130 lb).

## 2024-02-03 NOTE — H&P
[facility]    History and Physical - Hospitalist Service       Date of Admission:  2/2/2024    Assessment & Plan      El Booth is a 38 year old female admitted on 2/2/2024. She has a past medical history of depression, reflux, insomnia, who presents with sudden onset right upper quadrant pain.    On admission is vitally stable, notable labs include normal CMP including normal transaminases, normal CBC including hemoglobin 15.5, negative for COVID influenza RSV, CT PE study was negative and no PE but does note a 3.4 cm left adnexal cyst-no specific follow-up required (discussed with patient may have the option of surveillance interval monitoring with outpatient provider), follow-up ultrasound demonstrates 3.2 cm left ovarian hemorrhagic cyst-this is not a ruptured cyst and there is no torsion; monitoring clinically; ultrasound abdomen demonstrates no acute findings but does note gallstones.  On admission, right upper quadrant pain is 8 out of 10 in severity.  We discussed potential for HIDA scan and or surgical consultation if her pain is progressive.  Admitting to observation.  -----  RUQ pain  Nausea with nonbloody emesis, diarrhea day prior  Gallstones  Left ovarian hemorrhagic cyst- asymptomatic no lower abdominal or pelvic pain  A- as above.  Her right upper quadrant pain has not gotten worse but has also not improved dramatically.  However, she does have significant family history of gallbladder disease and gallstones in both her grandmother and mother.  As such, suspect she may require HIDA scan and/or surgical input if it progresses overnight.  Regarding the cyst, it is hemorrhagic and appears asymptomatic so we will clinically monitor for now, no torsion or ruptured cyst noted -if her pelvis or lower abdomen develops any pain or symptoms would consider interval imaging and may require surgical input if there is a rupture/torsion.  Discussed with her interval follow-up  scan within 3 or so months.  P:  - register to observation  - pain control- ibuprofen and toradol, for severe will have oxycodone  - nausea control zofran and compazine  - npo at midnight  - consider hida scan on 2/3  - consider General Surgery consult pending overnight course  - monitor for lower abdominal or pelvic symptoms--> if so would repeat US of pelvis     Reflux  A/p- appears stable, continue home meds    Depression  Insomnia  A/p- stable, continue home meds, add on melatonin prn      Observation Goals: -diagnostic tests and consults completed and resulted, -vital signs normal or at patient baseline, -adequate pain control on oral analgesics, Nurse to notify provider when observation goals have been met and patient is ready for discharge.  Diet: NPO per Anesthesia Guidelines for Procedure/Surgery Except for: Meds  Clear Liquid Diet   DVT Prophylaxis: Pneumatic Compression Devices and Ambulate every shift  Leone Catheter: Not present  Lines: None     Cardiac Monitoring: None  Code Status: Full Code     Clinically Significant Risk Factors Present on Admission                        # Overweight: Estimated body mass index is 25.39 kg/m  as calculated from the following:    Height as of this encounter: 1.524 m (5').    Weight as of this encounter: 59 kg (130 lb).              Disposition Plan      Expected Discharge Date: 02/03/2024                  Bruno Roberts MD  Hospitalist Service  [facility]  Securely message with [employer] (more info)  Text page via [facility] Paging/Directory     ______________________________________________________________________    Chief Complaint   Abd pain     History is obtained from the patient    History of Present Illness   El Booth is a 38 year old female who has a past medical history of depression, reflux, insomnia, who presents with sudden onset right upper quadrant pain. Notable labs include normal CMP including normal transaminases, normal  CBC including hemoglobin 15.5, negative for COVID influenza RSV, CT PE study was negative and no PE but does note a 3.4 cm left adnexal cyst-no specific follow-up required (discussed with patient may have the option of surveillance interval monitoring with outpatient provider), follow-up ultrasound demonstrates 3.2 cm left ovarian hemorrhagic cyst-this is not a ruptured cyst and there is no torsion; monitoring clinically; ultrasound abdomen demonstrates no acute findings but does note gallstones.  On admission, right upper quadrant pain is 8 out of 10 in severity.  We discussed potential for HIDA scan and or surgical consultation if her pain is progressive.      On interview, the patient confirms the aforementioned and also reports symptoms began overnight, notably she had a sandwich from [employer].  She developed some diarrhea and nausea with nonbloody emesis.  Persistent abdominal pain brought her into the ER.  She confirms a history of eating a lot of fatty foods in general, and also significant family history of gallbladder disease and gallstones in both her grandmother and mother.  She also does not have any lower abdominal/pelvic pain.  However, she does have history of four ectopic pregnancies in the past.  Discussed ovarian cyst findings on today's imaging.  We also discussed interval following up imaging in 3 months or so.    Otherwise, no other symptoms noted including no headaches, fevers, chills, chest pain or shortness of breath including tachypnea dyspnea or coughs, no constipation, no dysuria, no neurologic changes or sensory changes. The pt is Full code.        Past Medical History    Past Medical History:   Diagnosis Date    Anxiety     Chronic headache     Ectopic pregnancy        Past Surgical History   Past Surgical History:   Procedure Laterality Date    DE LAP,DIAGNOSTIC ABDOMEN N/A 6/8/2018    Procedure: LAPAROSCOPY LEFT SIDED SALPINGECTOMY EVACUATION HEMO PERITONEUM;  Surgeon: Clemencia DIAZ  DO Ash;  Location: [facility] Main OR;  Service: Gynecology       Prior to Admission Medications   Prior to Admission Medications   Prescriptions Last Dose Informant Patient Reported? Taking?   Multiple Vitamin (ONE-A-DAY ESSENTIAL) TABS 2/1/2024  Yes Yes   Sig: Take 1 tablet by mouth at bedtime   Vitamin D3 (VITAMIN D, CHOLECALCIFEROL,) 25 mcg (1000 units) tablet 2/1/2024 at hs  Yes Yes   Sig: Take 1 tablet by mouth at bedtime   biotin 1000 MCG TABS tablet 2/1/2024 at hs  Yes Yes   Sig: Take 1,000 mcg by mouth at bedtime   escitalopram (LEXAPRO) 10 MG tablet 2/1/2024 at hs  Yes Yes   Sig: Take 10 mg by mouth at bedtime   magic mouthwash (ENTER INGREDIENTS IN COMMENTS) suspension 2/2/2024 at am  Yes Yes   Sig: Take 15 mLs by mouth 3 times daily Antacid, diphenhydramine, lidocaine, nystatin   omeprazole (PRILOSEC) 40 MG DR capsule 2/1/2024 at hs  Yes Yes   Sig: Take 40 mg by mouth at bedtime   traZODone (DESYREL) 100 MG tablet 2/1/2024 at hs  Yes Yes   Sig: Take 150 mg by mouth at bedtime   triamcinolone (KENALOG) 0.1 % paste 2/2/2024 at am  Yes Yes   Sig: Take by mouth 2 times daily   vitamin C (ASCORBIC ACID) 500 MG tablet 2/1/2024 at hs  Yes Yes   Sig: Take 500 mg by mouth at bedtime      Facility-Administered Medications: None        Review of Systems    The 10 point Review of Systems is negative other than noted in the HPI or here.      Social History   I have reviewed this patient's social history and updated it with pertinent information if needed.  Social History     Tobacco Use    Smoking status: Former   Substance Use Topics    Alcohol use: Yes    Drug use: Yes     Types: Oxycodone         Family History   I have reviewed this patient's family history and updated it with pertinent information if needed.  Family History   Problem Relation Age of Onset    Depression Mother     Depression Father     Hyperlipidemia Father    Family hx of gallbladder stones in both her mother and grandmother    Allergies    No Known Allergies     Physical Exam   Vital Signs: Temp: 98.5  F (36.9  C) Temp src: Temporal BP: 113/85 Pulse: 87   Resp: 20 SpO2: 99 % O2 Device: None (Room air)    Weight: 130 lbs 0 oz      GENERAL:  Alert, appears comfortable, in no acute distress, appears stated age   HEAD:  Normocephalic, without obvious abnormality, atraumatic   EYES:  PERRL, conjunctiva/corneas clear, no scleral icterus, EOM's intact   NOSE: Nares normal, septum midline, mucosa normal, no drainage   THROAT: Lips, mucosa, and tongue normal; teeth and gums normal, mouth moist   NECK: Supple, symmetrical, trachea midline   BACK:   Symmetric, no curvature   LUNGS:   Clear to auscultation bilaterally, no rales, rhonchi, or wheezing, symmetric chest rise on inhalation, respirations unlabored, on room air    CHEST WALL:  No tenderness or conspicuous deformity   HEART:  Regular rate and rhythm, S1 and S2 normal, no murmur, rub, or gallop, no conspicuous jugular venous distention noted, peripheral pulses intact    ABDOMEN:   Soft, mildly tender in the epigastrium, positive Cutler sign and severe right upper quadrant tenderness including with both gentle and deep palpation and percussion; bowel sounds auscultated in all four quadrants, no masses, no organomegaly, no rebound or guarding    EXTREMITIES: Extremities normal, atraumatic, no cyanosis or edema    SKIN: Dry to touch, no exanthems in the visualized areas or erythema   NEURO: Alert, oriented x3, moves all four extremities of their own volition, strength is 5/5 in 4 limbs    PSYCH: Cooperative and behavior is appropriate and a bit anxious appearing         Medical Decision Making       78 MINUTES SPENT BY ME on the date of service doing chart review, history, exam, documentation & further activities per the note.      Data   ------------------------- PAST 24 HR DATA REVIEWED -----------------------------------------------    I have personally reviewed the following data over the past 24  hrs:    9.8  \   15.5   / 326     136 102 7.8 /  131 (H)   3.7 21 (L) 0.62 \     ALT: 12 AST: 15 AP: 103 TBILI: 0.2   ALB: 4.2 TOT PROTEIN: 7.3 LIPASE: 36     Trop: <6 BNP: N/A       Imaging results reviewed over the past 24 hrs:   Recent Results (from the past 24 hour(s))   CT Chest (PE) Abdomen Pelvis w Contrast    Narrative    EXAM: CT CHEST PE ABDOMEN PELVIS W CONTRAST  LOCATION: [facility]  DATE: 2/2/2024    INDICATION: Right sided pain evaluate for PE. Right-sided chest and abdominal pain Tachycardic.  COMPARISON: CT abdomen and pelvis from 05/08/2021  TECHNIQUE: CT chest pulmonary angiogram and routine CT abdomen pelvis with IV contrast. Arterial phase through the chest and venous phase through the abdomen and pelvis. Multiplanar reformats and MIP reconstructions were performed. Dose reduction   techniques were used.   CONTRAST: 90ml Isovue 370    FINDINGS:  ANGIOGRAM CHEST: Pulmonary arteries are normal caliber and negative for pulmonary emboli. Thoracic aorta is negative for dissection.     LUNGS AND PLEURA: Lungs are clear. No effusions.    MEDIASTINUM/AXILLAE: Heart size is normal. No adenopathy.    CORONARY ARTERY CALCIFICATION: None.    HEPATOBILIARY: Normal.    PANCREAS: Normal.    SPLEEN: Normal.    ADRENAL GLANDS: Normal.    KIDNEYS/BLADDER: No significant mass, stone, or hydronephrosis.    BOWEL: No obstruction or inflammatory change. No evidence of appendicitis.    LYMPH NODES: Normal.    VASCULATURE: Unremarkable.    PELVIC ORGANS: Left corpus luteal cyst. Left adnexal cyst measures 3.4 x 2.9 cm. Small amount of physiologic free fluid in the pelvis.    MUSCULOSKELETAL: No acute osseous findings.      Impression    IMPRESSION:  1.  No pulmonary embolus or CT evidence for source of symptoms.  2.  Left corpus luteal cyst. Left adnexal cyst measures 3.4 cm. No specific follow-up required.    REFERENCE:  Management of Incidental Adnexal Findings on CT and MRI: A White Paper of  the ACR Incidental Findings Committee. J Am Lachelle Radiol 2020; 17(2):248-254.    Premenopausal or <50 if status unkown:    Equal to or <5 cm: No further imaging.       US Pelvic Complete with Transvaginal    Narrative    EXAM: US PELVIC TRANSABDOMINAL AND TRANSVAGINAL  LOCATION: [facility]  DATE: 2/2/2024    INDICATION: Pain. EVALUTE FOR OVARIAN TORSION  COMPARISON: None.  TECHNIQUE: Transabdominal scans were performed. Endovaginal ultrasound was performed to better visualize the adnexa.    FINDINGS:    UTERUS: 8.9 x 6.4 x 4.7 cm. Anteverted with normal parenchyma. Heterogeneous hypoechoic mass measuring 1.0 cm near the uterine fundus likely representing an intramural fibroid.    ENDOMETRIUM: 13 mm. Normal smooth endometrium.    RIGHT OVARY: 2.9 x 2.9 x 1.4 cm. Documented vascular flow.    LEFT OVARY: 4.0 x 4.9 x 2.9 cm. Documented vascular flow. Within the left ovary is a complex hypoechoic cyst measuring 3.2 x 3.0 x 2.7 cm. This demonstrates reticular/lacelike internal echoes with a sonographic appearance most compatible with a   hemorrhagic ovarian cyst.    No significant free fluid.      Impression    IMPRESSION:  1.  Approximately 3.2 cm left ovarian hemorrhagic cyst. Please see below for imaging recommendations.  2.  Approximately 1.0 cm intramural uterine fibroid.  3.  No evidence of ovarian torsion.      HEMORRHAGIC CYSTS:  Premenopausal women/early post-menopausal (<5 years from LMP):  <=5 cm: no follow up needed.   >5 cm: follow up in 6-12 weeks to ensure resolution    Late post-menopausal women (>5 years from LMP):  Any size should be considered neoplastic and surgical evaluation should be considered.    REFERENCE:  Management of Asymptomatic Ovarian and Other Adnexal Cysts Imaged at US: Society of Radiologists in Ultrasound Consensus Conference Statement. Radiology September 2010; [phone].    Simple Adnexal Cysts: SRU Consensus Conference Update on Follow-up and Reporting.  Radiology September 2019. [phone].   US Abdomen Limited    Narrative    EXAM: US ABDOMEN LIMITED  LOCATION: [facility]  DATE: 2/2/2024    INDICATION: pain evaluate for cholelithiasis  COMPARISON: CT chest, abdomen and pelvis 02/02/2024  TECHNIQUE: Limited abdominal ultrasound.    FINDINGS:    GALLBLADDER: Multiple stones in the gallbladder. No wall thickening or pericholecystic fluid. Negative sonographic Cutler's sign.    BILE DUCTS: No biliary dilatation. The common duct measures 2 mm.    LIVER: Normal parenchyma with smooth contour. No focal mass.    RIGHT KIDNEY: No hydronephrosis.    PANCREAS: The visualized portions are normal.    No ascites.      Impression    IMPRESSION:  Gallstones. No evidence for acute cholecystitis.

## 2024-02-03 NOTE — DISCHARGE INSTRUCTIONS
Follow up: Please call us at [phone] to schedule an appointment at your convenience.      If you would prefer to follow up with us by phone please let us know so that we may contact you 2-3 weeks following your procedure.         Diet: Regular diet.  Foods high infiber are recommended with 8 to 10 glasses of fluids each day.     Patients can have difficulty with constipation following surgery, due in part to the administration of narcotic medications.  If you are suffering with constipation, you should avoid foods such as hard cheeses or red meat.      Activity: You should continue to be active at home, including ambulating frequently.  If possible try to limit the amount of time spent in bed.     Restrictions: You have no lifting restrictions post operatively, but may wish to avoid strenuous physical activity for 1-2 weeks.  You should limit your physical activity if it causes you discomfort; however, this should resolve within 1-2 weeks.   Walking does not count as strenuous physical activity.  You are safe to walk up and down stairs.  Following 2 weeks you may resume all normal physical activity.     Wound / drain care: Your incisions are closed using absorbale sutures.  The skin is sealed with a surgical glue.  Do not peal the glue off.  Please allow the glue to peal off on its own.      It is normal to have a small rim of red present around the incisions. This should not, however, extend beyond 1/4 inch from the incision.  If your incisions become increasingly tender, red, or draining, please contact us.        Bathing: You may shower after 24 hours from surgery.  It is ok to get your incisions wet, but avoid rubbing them.  Avoid soaking in bath tubs, or swimming in lakes, pools, or streams for 4 weeks following surgery.

## 2024-02-03 NOTE — PHARMACY-ADMISSION MEDICATION HISTORY
Pharmacist Admission Medication History    Admission medication history is complete. The information provided in this note is only as accurate as the sources available at the time of the update.    Medication reconciliation/reorder completed by provider prior to medication history? No    Information Source(s): Patient and CareEverywhere/SureScripts via in-person    Pertinent Information:     Changes made to PTA medication list:  Added: Vitamin C, vitamin D, multivitamin, biotin, magic mouthwash, omeprazole, triamcinolone  Deleted: None  Changed: Escitalopram 5 mg to 10 mg daily, trazodone 50 mg to 150 mg at bedtime    Medication Affordability:       Allergies reviewed with patient and updates made in EHR: yes    Medications available for use during hospital stay: NONE.     Medication History Completed By: Mayra Banks [facility] 2/2/2024 6:38 PM    PTA Med List   Medication Sig Last Dose    biotin 1000 MCG TABS tablet Take 1,000 mcg by mouth at bedtime 2/1/2024 at hs    escitalopram (LEXAPRO) 10 MG tablet Take 10 mg by mouth at bedtime 2/1/2024 at hs    magic mouthwash (ENTER INGREDIENTS IN COMMENTS) suspension Take 15 mLs by mouth 3 times daily Antacid, diphenhydramine, lidocaine, nystatin 2/2/2024 at am    Multiple Vitamin (ONE-A-DAY ESSENTIAL) TABS Take 1 tablet by mouth at bedtime 2/1/2024    omeprazole (PRILOSEC) 40 MG DR capsule Take 40 mg by mouth at bedtime 2/1/2024 at hs    traZODone (DESYREL) 100 MG tablet Take 150 mg by mouth at bedtime 2/1/2024 at hs    triamcinolone (KENALOG) 0.1 % paste Take by mouth 2 times daily 2/2/2024 at am    vitamin C (ASCORBIC ACID) 500 MG tablet Take 500 mg by mouth at bedtime 2/1/2024 at hs    Vitamin D3 (VITAMIN D, CHOLECALCIFEROL,) 25 mcg (1000 units) tablet Take 1 tablet by mouth at bedtime 2/1/2024 at hs

## 2024-02-03 NOTE — PLAN OF CARE
"  Problem: Adult Inpatient Plan of Care  Goal: Optimal Comfort and Wellbeing  2/3/2024 0257 by Lyubov Tomas RN  Outcome: Progressing  2/3/2024 0257 by Lyubov Tomas RN  Outcome: Progressing     Problem: Pain Acute  Goal: Optimal Pain Control and Function  Outcome: Not Progressing   Goal Outcome Evaluation:    PRIMARY DIAGNOSIS: ACUTE PAIN  OUTPATIENT/OBSERVATION GOALS TO BE MET BEFORE DISCHARGE:  1. Pain Status: Improved but still requiring IV narcotics.    2. Return to near baseline physical activity: Yes    3. Cleared for discharge by consultants (if involved): No    Discharge Planner Nurse   Safe discharge environment identified: Yes  Barriers to discharge: No       Entered by: Lyubov Tomas RN 02/03/2024 4:09 AM     Please review provider order for any additional goals.   Nurse to notify provider when observation goals have been met and patient is ready for discharge.    Pt is alert and oriented x4. Pt was observed crying or tearful multiple times during this shift. A lot of emotional support was required.  VSS on RA. Pt stated that she was nausea. Administered PRN IV zofran was; pt stated that it was effective.    Pt rated pain from 7-10/10 on the pain scale. Stated that PRN oxycodone was very little effective. Pt stated that pain was \"constant and felt like something was touching me.\" PRN IV morphine and toradol where implemented. Pt stated PRN morphine was the most effective intervention. Pt stated that applying warm pack was effective.  Pt stated that pain was \"consistent\" until 0452 hours. At that time pt stated that she did not have any pain while at rest any when I move\".   Pt stated that she had a HA at 2159, pt stated that PRN IV Toradol was effective.     Denied any SOB, or chest pain. No coughing was observed at this time.     Pt was on Clear liquid diet and after midnight was NPO.     Pt is able to ambulate at least 25 feet during this shift. Scores a Level 7.     Lyubov Tomas RN, BSN, PHN " (1800-1025 hours).

## 2024-02-03 NOTE — ANESTHESIA PROCEDURE NOTES
Airway       Patient location during procedure: OR       Procedure Start/Stop Times: 2/3/2024 2:05 PM  Staff -        Anesthesiologist:  Sinan Wolf MD       CRNA: Tasha Martin APRN CRNA       Performed By: anesthesiologist and CRNAIndications and Patient Condition       Indications for airway management: clinton-procedural       Induction type:intravenous       Mask difficulty assessment: 1 - vent by mask    Final Airway Details       Final airway type: endotracheal airway       Successful airway: ETT - single  Endotracheal Airway Details        ETT size (mm): 7.0       Cuffed: yes       Cuff volume (mL): 8       Successful intubation technique: direct laryngoscopy       DL Blade Type: Engel 2       Grade View of Cords: 1       Adjucts: stylet       Position: Right       Measured from: lips       Secured at (cm): 22       Bite block used: None    Post intubation assessment        Placement verified by: capnometry, equal breath sounds and chest rise        Number of attempts at approach: 1       Number of other approaches attempted: 0       Secured with: tape       Ease of procedure: easy       Dentition: Intact and Unchanged       Dental guard used and removed. Dental Guard Type: Standard White.    Medication(s) Administered   Medication Administration Time: 2/3/2024 2:05 PM

## 2024-02-03 NOTE — CONSULTS
General Surgery Consultation  El Booth MRN# [id number]   Age/Sex: 38 year old female YOB: 1985     Reason for consult: 1. Symptomatic cholelithiasis    2. Generalized abdominal pain    3. Left ovarian cyst    4. Biliary colic    5. Gallstones            Requesting physician: Dr. Roberts                   Assessment and Plan:   Assessment:  Cholelithiasis with bilary colic. No signs of cholecystitis on imaging or in lab work up but patient is tender in the RUQ. Patient has been NPO so if surgical time is available this afternoon, we will schedule her for cholecystectomy and send patient home from PACU.    Plan:  NPO  Lap donell this afternoon          Chief Complaint:     Chief Complaint   Patient presents with    Abdominal Pain    Nausea, Vomiting, & Diarrhea    Back Pain        History is obtained from the patient    HPI:   El Booth is a 38 year old female who presents with RUQ pain that radiates to her epigastrium and her back. PMH significant for GERD, insomnia and depression. Patient presents to the ED with 2 day history of worsening RUQ abdominal pain. She reports associated nausea, vomiting and diarrhea. Pain has not improved since being in the hospital and patient is adamant that she wants her gallbladder out before she leaves the hospital. Patient denies fever, chills, SOB, CP, hematemesis, acholic/melanotic stool, rashes or lesions.          Past Medical History:     Past Medical History:   Diagnosis Date    Anxiety     Chronic headache     Ectopic pregnancy               Past Surgical History:     Past Surgical History:   Procedure Laterality Date    NC LAP,DIAGNOSTIC ABDOMEN N/A 6/8/2018    Procedure: LAPAROSCOPY LEFT SIDED SALPINGECTOMY EVACUATION HEMO PERITONEUM;  Surgeon: Clemencia Aleman DO;  Location: [facility];  Service: Gynecology             Social History:    reports that she has quit smoking. She does not have any smokeless tobacco history on file.  She reports current alcohol use. She reports current drug use. Drug: Oxycodone.           Family History:     Family History   Problem Relation Age of Onset    Depression Mother     Depression Father     Hyperlipidemia Father               Allergies:   No Known Allergies           Medications:     Prior to Admission medications    Medication Sig Start Date End Date Taking? Authorizing Provider   biotin 1000 MCG TABS tablet Take 1,000 mcg by mouth at bedtime   Yes Unknown, Entered By History   escitalopram (LEXAPRO) 10 MG tablet Take 10 mg by mouth at bedtime 1/15/24  Yes Unknown, Entered By History   magic mouthwash (ENTER INGREDIENTS IN COMMENTS) suspension Take 15 mLs by mouth 3 times daily Antacid, diphenhydramine, lidocaine, nystatin   Yes Unknown, Entered By History   Multiple Vitamin (ONE-A-DAY ESSENTIAL) TABS Take 1 tablet by mouth at bedtime   Yes Unknown, Entered By History   omeprazole (PRILOSEC) 40 MG DR capsule Take 40 mg by mouth at bedtime 1/20/24  Yes Unknown, Entered By History   traZODone (DESYREL) 100 MG tablet Take 150 mg by mouth at bedtime 1/15/24  Yes Unknown, Entered By History   triamcinolone (KENALOG) 0.1 % paste Take by mouth 2 times daily 1/20/24  Yes Unknown, Entered By History   vitamin C (ASCORBIC ACID) 500 MG tablet Take 500 mg by mouth at bedtime   Yes Unknown, Entered By History   Vitamin D3 (VITAMIN D, CHOLECALCIFEROL,) 25 mcg (1000 units) tablet Take 1 tablet by mouth at bedtime   Yes Unknown, Entered By History              Review of Systems:   The Review of Systems is negative other than noted in the HPI            Physical Exam:   Patient Vitals for the past 24 hrs:   BP Temp Temp src Pulse Resp SpO2 Height Weight   02/03/24 0914 93/52 -- -- -- -- -- -- --   02/03/24 0830 97/54 -- -- -- -- -- -- --   02/03/24 0802 91/55 -- -- -- -- -- -- --   02/03/24 0754 (!) 87/50 98.5  F (36.9  C) Oral 80 16 94 % -- --   02/03/24 0423 111/58 98.7  F (37.1  C) Oral 90 18 95 % -- --   02/03/24  0006 109/58 98.9  F (37.2  C) Oral 99 18 96 % -- --   02/02/24 1948 119/77 97.8  F (36.6  C) Oral 107 20 97 % -- --   02/02/24 1801 -- -- -- 87 -- 99 % -- --   02/02/24 1730 -- -- -- 97 -- 100 % -- --   02/02/24 1700 -- -- -- 102 -- 97 % -- --   02/02/24 1649 113/85 -- -- 102 -- 93 % -- --   02/02/24 1508 119/69 -- -- 82 -- 97 % -- --   02/02/24 1459 -- -- -- 88 -- -- -- --   02/02/24 1400 130/74 -- -- 100 -- 97 % -- --   02/02/24 1346 97/56 -- -- 106 -- 95 % -- --   02/02/24 1330 115/78 -- -- 107 -- 96 % -- --   02/02/24 1329 -- -- -- 104 -- 97 % -- --   02/02/24 1319 (!) 140/74 -- -- (!) 126 -- 96 % -- --   02/02/24 1304 127/56 98.5  F (36.9  C) Temporal (!) 140 20 95 % 1.524 m (5') 59 kg (130 lb)          Intake/Output Summary (Last 24 hours) at 2/3/2024 1026  Last data filed at 2/2/2024 2139  Gross per 24 hour   Intake --   Output 250 ml   Net -250 ml      Constitutional:   awake, alert, cooperative, no apparent distress, and appears stated age       Eyes:   PERRL, conjunctiva/corneas clear, EOM's intact; no scleral edema or icterus noted        ENT:   Normocephalic, without obvious abnormality, atraumatic, Lips, mucosa, and tongue normal          Lungs:   Normal respiratory effort, no accessory muscle use       Cardiovascular:   Regular rate and rhythm       Abdomen:   Soft, not distended, tender in RUQ and epigastrium; no peritoneal signs       Musculoskeletal:   No obvious swelling, bruising or deformity       Skin:   Skin color and texture normal for patient, no rashes or lesions              Data:         All imaging studies reviewed by me.    Results for orders placed or performed during the hospital encounter of 02/02/24 (from the past 24 hour(s))   [place] Draw    Narrative    The following orders were created for panel order [place] Draw.  Procedure                               Abnormality         Status                     ---------                               -----------         ------                      Extra Blue Top Tube[[id number]]                              Final result               Extra Red Top Tube[[id number]]                                                          Extra Green Top (Lithium...[[id number]]                                                 Extra Purple Top Tube[[id number]]                                                       Extra Green Top (Lithium...[[id number]]                      Final result                 Please view results for these tests on the individual orders.   Extra Blue Top Tube   Result Value Ref Range    Hold Specimen JIC    Extra Green Top (Lithium Heparin) ON ICE   Result Value Ref Range    Hold Specimen     CBC with platelets + differential    Narrative    The following orders were created for panel order CBC with platelets + differential.  Procedure                               Abnormality         Status                     ---------                               -----------         ------                     CBC with platelets and d...[[id number]]                      Final result                 Please view results for these tests on the individual orders.   Basic metabolic panel   Result Value Ref Range    Sodium 136 135 - 145 mmol/L    Potassium 3.7 3.4 - 5.3 mmol/L    Chloride 102 98 - 107 mmol/L    Carbon Dioxide (CO2) 21 (L) 22 - 29 mmol/L    Anion Gap 13 7 - 15 mmol/L    Urea Nitrogen 7.8 6.0 - 20.0 mg/dL    Creatinine 0.62 0.51 - 0.95 mg/dL    GFR Estimate >90 >60 mL/min/1.73m2    Calcium 9.3 8.6 - 10.0 mg/dL    Glucose 131 (H) 70 - 99 mg/dL   Hepatic function panel   Result Value Ref Range    Protein Total 7.3 6.4 - 8.3 g/dL    Albumin 4.2 3.5 - 5.2 g/dL    Bilirubin Total 0.2 <=1.2 mg/dL    Alkaline Phosphatase 103 40 - 150 U/L    AST 15 0 - 45 U/L    ALT 12 0 - 50 U/L    Bilirubin Direct <0.20 0.00 - 0.30 mg/dL   Lipase   Result Value Ref Range    Lipase 36 13 - 60 U/L   HCG QUALitative pregnancy (blood)   Result Value Ref Range    hCG Serum Qualitative  Negative Negative   CBC with platelets and differential   Result Value Ref Range    WBC Count 9.8 4.0 - 11.0 10e3/uL    RBC Count 4.75 3.80 - 5.20 10e6/uL    Hemoglobin 15.5 11.7 - 15.7 g/dL    Hematocrit 44.3 35.0 - 47.0 %    MCV 93 78 - 100 fL    MCH 32.6 26.5 - 33.0 pg    MCHC 35.0 31.5 - 36.5 g/dL    RDW 13.1 10.0 - 15.0 %    Platelet Count 326 150 - 450 10e3/uL    % Neutrophils 84 %    % Lymphocytes 12 %    % Monocytes 4 %    % Eosinophils 0 %    % Basophils 0 %    % Immature Granulocytes 0 %    NRBCs per 100 WBC 0 <1 /100    Absolute Neutrophils 8.1 1.6 - 8.3 10e3/uL    Absolute Lymphocytes 1.2 0.8 - 5.3 10e3/uL    Absolute Monocytes 0.4 0.0 - 1.3 10e3/uL    Absolute Eosinophils 0.0 0.0 - 0.7 10e3/uL    Absolute Basophils 0.0 0.0 - 0.2 10e3/uL    Absolute Immature Granulocytes 0.0 <=0.4 10e3/uL    Absolute NRBCs 0.0 10e3/uL   Troponin T, High Sensitivity (now)   Result Value Ref Range    Troponin T, High Sensitivity <6 <=14 ng/L   ECG 12-LEAD WITH MUSE (LHE)   Result Value Ref Range    Systolic Blood Pressure  mmHg    Diastolic Blood Pressure  mmHg    Ventricular Rate 132 BPM    Atrial Rate 132 BPM    OR Interval 142 ms    QRS Duration 68 ms     ms    QTc 417 ms    P Axis 65 degrees    R AXIS 94 degrees    T Axis 21 degrees    Interpretation ECG       Sinus tachycardia  Possible Left atrial enlargement  Rightward axis  Borderline ECG  No previous ECGs available  Confirmed by SEE ED PROVIDER NOTE FOR, ECG INTERPRETATION ([id number]),  Caryn Kendall ([id number]) on 2/2/2024 5:20:09 PM     Symptomatic Influenza A/B, RSV, & SARS-CoV2 PCR (COVID-19) Nasopharyngeal    Specimen: Nasopharyngeal; Swab   Result Value Ref Range    Influenza A PCR Negative Negative    Influenza B PCR Negative Negative    RSV PCR Negative Negative    SARS CoV2 PCR Negative Negative    Narrative    Testing was performed using the Xpert Xpress CoV2/Flu/RSV Assay on the [employer] Instrument. This test should be ordered for the  detection of SARS-CoV-2, influenza, and RSV viruses in individuals who meet clinical and/or epidemiological criteria. Test performance is unknown in asymptomatic patients. This test is for in vitro diagnostic use under the FDA EUA for laboratories certified under CLIA to perform high or moderate complexity testing. This test has not been FDA cleared or approved. A negative result does not rule out the presence of PCR inhibitors in the specimen or target RNA in concentration below the limit of detection for the assay. If only one viral target is positive but coinfection with multiple targets is suspected, the sample should be re-tested with another FDA cleared, approved, or authorized test, if coinfection would change clinical management. This test was validated by the [facility]. These laboratories are certified under the Clinical Laboratory Improvement Amendments of 1988 (CLIA-88) as qualified to perform high complexity laboratory testing.   CT Chest (PE) Abdomen Pelvis w Contrast    Narrative    EXAM: CT CHEST PE ABDOMEN PELVIS W CONTRAST  LOCATION: [facility]  DATE: 2/2/2024    INDICATION: Right sided pain evaluate for PE. Right-sided chest and abdominal pain Tachycardic.  COMPARISON: CT abdomen and pelvis from 05/08/2021  TECHNIQUE: CT chest pulmonary angiogram and routine CT abdomen pelvis with IV contrast. Arterial phase through the chest and venous phase through the abdomen and pelvis. Multiplanar reformats and MIP reconstructions were performed. Dose reduction   techniques were used.   CONTRAST: 90ml Isovue 370    FINDINGS:  ANGIOGRAM CHEST: Pulmonary arteries are normal caliber and negative for pulmonary emboli. Thoracic aorta is negative for dissection.     LUNGS AND PLEURA: Lungs are clear. No effusions.    MEDIASTINUM/AXILLAE: Heart size is normal. No adenopathy.    CORONARY ARTERY CALCIFICATION: None.    HEPATOBILIARY: Normal.    PANCREAS: Normal.    SPLEEN:  Normal.    ADRENAL GLANDS: Normal.    KIDNEYS/BLADDER: No significant mass, stone, or hydronephrosis.    BOWEL: No obstruction or inflammatory change. No evidence of appendicitis.    LYMPH NODES: Normal.    VASCULATURE: Unremarkable.    PELVIC ORGANS: Left corpus luteal cyst. Left adnexal cyst measures 3.4 x 2.9 cm. Small amount of physiologic free fluid in the pelvis.    MUSCULOSKELETAL: No acute osseous findings.      Impression    IMPRESSION:  1.  No pulmonary embolus or CT evidence for source of symptoms.  2.  Left corpus luteal cyst. Left adnexal cyst measures 3.4 cm. No specific follow-up required.    REFERENCE:  Management of Incidental Adnexal Findings on CT and MRI: A White Paper of the ACR Incidental Findings Committee. J Am Lachelle Radiol 2020; 17(2):248-254.    Premenopausal or <50 if status unkown:    Equal to or <5 cm: No further imaging.       US Pelvic Complete with Transvaginal    Narrative    EXAM: US PELVIC TRANSABDOMINAL AND TRANSVAGINAL  LOCATION: [facility]  DATE: 2/2/2024    INDICATION: Pain. EVALUTE FOR OVARIAN TORSION  COMPARISON: None.  TECHNIQUE: Transabdominal scans were performed. Endovaginal ultrasound was performed to better visualize the adnexa.    FINDINGS:    UTERUS: 8.9 x 6.4 x 4.7 cm. Anteverted with normal parenchyma. Heterogeneous hypoechoic mass measuring 1.0 cm near the uterine fundus likely representing an intramural fibroid.    ENDOMETRIUM: 13 mm. Normal smooth endometrium.    RIGHT OVARY: 2.9 x 2.9 x 1.4 cm. Documented vascular flow.    LEFT OVARY: 4.0 x 4.9 x 2.9 cm. Documented vascular flow. Within the left ovary is a complex hypoechoic cyst measuring 3.2 x 3.0 x 2.7 cm. This demonstrates reticular/lacelike internal echoes with a sonographic appearance most compatible with a   hemorrhagic ovarian cyst.    No significant free fluid.      Impression    IMPRESSION:  1.  Approximately 3.2 cm left ovarian hemorrhagic cyst. Please see below for imaging  recommendations.  2.  Approximately 1.0 cm intramural uterine fibroid.  3.  No evidence of ovarian torsion.      HEMORRHAGIC CYSTS:  Premenopausal women/early post-menopausal (<5 years from LMP):  <=5 cm: no follow up needed.   >5 cm: follow up in 6-12 weeks to ensure resolution    Late post-menopausal women (>5 years from LMP):  Any size should be considered neoplastic and surgical evaluation should be considered.    REFERENCE:  Management of Asymptomatic Ovarian and Other Adnexal Cysts Imaged at US: Society of Radiologists in Ultrasound Consensus Conference Statement. Radiology September 2010; [id number].    Simple Adnexal Cysts: SRU Consensus Conference Update on Follow-up and Reporting. Radiology September 2019. [phone].   US Abdomen Limited    Narrative    EXAM: US ABDOMEN LIMITED  LOCATION: [facility]  DATE: 2/2/2024    INDICATION: pain evaluate for cholelithiasis  COMPARISON: CT chest, abdomen and pelvis 02/02/2024  TECHNIQUE: Limited abdominal ultrasound.    FINDINGS:    GALLBLADDER: Multiple stones in the gallbladder. No wall thickening or pericholecystic fluid. Negative sonographic Cutler's sign.    BILE DUCTS: No biliary dilatation. The common duct measures 2 mm.    LIVER: Normal parenchyma with smooth contour. No focal mass.    RIGHT KIDNEY: No hydronephrosis.    PANCREAS: The visualized portions are normal.    No ascites.      Impression    IMPRESSION:  Gallstones. No evidence for acute cholecystitis.   Basic metabolic panel   Result Value Ref Range    Sodium 139 135 - 145 mmol/L    Potassium 3.6 3.4 - 5.3 mmol/L    Chloride 110 (H) 98 - 107 mmol/L    Carbon Dioxide (CO2) 24 22 - 29 mmol/L    Anion Gap 5 (L) 7 - 15 mmol/L    Urea Nitrogen 4.9 (L) 6.0 - 20.0 mg/dL    Creatinine 0.62 0.51 - 0.95 mg/dL    GFR Estimate >90 >60 mL/min/1.73m2    Calcium 8.6 8.6 - 10.0 mg/dL    Glucose 96 70 - 99 mg/dL   Hepatic panel   Result Value Ref Range    Protein Total 5.8 (L) 6.4 - 8.3  g/dL    Albumin 3.5 3.5 - 5.2 g/dL    Bilirubin Total <0.2 <=1.2 mg/dL    Alkaline Phosphatase 68 40 - 150 U/L    AST 15 0 - 45 U/L    ALT <5 0 - 50 U/L    Bilirubin Direct <0.20 0.00 - 0.30 mg/dL   CBC with platelets   Result Value Ref Range    WBC Count 7.1 4.0 - 11.0 10e3/uL    RBC Count 3.95 3.80 - 5.20 10e6/uL    Hemoglobin 12.7 11.7 - 15.7 g/dL    Hematocrit 37.0 35.0 - 47.0 %    MCV 94 78 - 100 fL    MCH 32.2 26.5 - 33.0 pg    MCHC 34.3 31.5 - 36.5 g/dL    RDW 13.1 10.0 - 15.0 %    Platelet Count 246 150 - 450 10e3/uL   Lipase   Result Value Ref Range    Lipase 33 13 - 60 U/L        Sol Yoo, APRN CNP

## 2024-02-03 NOTE — PLAN OF CARE
"PRIMARY DIAGNOSIS: \"GENERIC\" NURSING  OUTPATIENT/OBSERVATION GOALS TO BE MET BEFORE DISCHARGE:  ADLs back to baseline: Yes    Activity and level of assistance: Ambulating independently.    Pain status: Improved-controlled with oral pain medications.    Return to near baseline physical activity: Yes     Discharge Planner Nurse   Safe discharge environment identified: Yes  Barriers to discharge: Yes       Entered by: Alejandra Watt RN 02/03/2024 12:35 PM     Please review provider order for any additional goals.   Nurse to notify provider when observation goals have been met and patient is ready for discharge.    "

## 2024-02-03 NOTE — PLAN OF CARE
"Plan is for patient to go for lap donell this afternoon and discharge from PACU. Still rating pain 8/10, NPO. Total of 1,500 ml bolus given this morning for soft blood pressures.     PRIMARY DIAGNOSIS: \"GENERIC\" NURSING  OUTPATIENT/OBSERVATION GOALS TO BE MET BEFORE DISCHARGE:  ADLs back to baseline: Yes    Activity and level of assistance: Ambulating independently.    Pain status: Improved-controlled with oral pain medications.    Return to near baseline physical activity: Yes     Discharge Planner Nurse   Safe discharge environment identified: Yes  Barriers to discharge: Yes       Entered by: Alejandra Watt RN 02/03/2024 12:36 PM     Please review provider order for any additional goals.   Nurse to notify provider when observation goals have been met and patient is ready for discharge.  "

## 2024-02-03 NOTE — ANESTHESIA CARE TRANSFER NOTE
Patient: lE Booth    Procedure: Procedure(s):  CHOLECYSTECTOMY, ROBOT-ASSISTED, LAPAROSCOPIC, USING DA CHAD XI       Diagnosis: Symptomatic cholelithiasis [K80.20]  Diagnosis Additional Information: No value filed.    Anesthesia Type:   General     Note:    Oropharynx: oropharynx clear of all foreign objects and spontaneously breathing  Level of Consciousness: drowsy  Oxygen Supplementation: face mask  Level of Supplemental Oxygen (L/min / FiO2): 8  Independent Airway: airway patency satisfactory and stable  Dentition: dentition unchanged  Vital Signs Stable: post-procedure vital signs reviewed and stable  Report to RN Given: handoff report given  Patient transferred to: PACU    Handoff Report: Identifed the Patient, Identified the Reponsible Provider, Reviewed the pertinent medical history, Discussed the surgical course, Reviewed Intra-OP anesthesia mangement and issues during anesthesia, Set expectations for post-procedure period and Allowed opportunity for questions and acknowledgement of understanding      Vitals:  Vitals Value Taken Time   BP 98/58 02/03/24 1524   Temp 36.9  C (98.5  F) 02/03/24 1523   Pulse 92 02/03/24 1529   Resp 23 02/03/24 1529   SpO2 98 % 02/03/24 1529   Vitals shown include unfiled device data.    Electronically Signed By: FLAKITO Pink CRNA  February 3, 2024  3:30 PM

## 2024-02-03 NOTE — OP NOTE
[facility]    Operative Note    Pre-operative diagnosis: Symptomatic cholelithiasis [K80.20]  Post-operative diagnosis Same as pre-operative diagnosis    Procedure: CHOLECYSTECTOMY, ROBOT-ASSISTED, LAPAROSCOPIC, USING DA CHAD XI, N/A - Abdomen    Surgeon: Surgeon(s) and Role:     * Nicho Flanagan DO - Primary  Anesthesia: General   Estimated Blood Loss: 5 mL    Drains: None  Specimens:   ID Type Source Tests Collected by Time Destination   1 : Gallbladder Tissue Gallbladder SURGICAL PATHOLOGY EXAM Nicho Flanagan DO 2/3/2024  2:52 PM      Findings:     - chronic cholecystitis.  -Intraoperative cholangiogram completed to confirm anatomy    Complications: None.  Implants: * No implants in log *    Indication: 37 yo male presenting abdominal pain.  Patient was found to have symptomatic cholelithiasis versus early cholecystitis.  Ultrasound showed gallstones.  After evaluation offered patient procedure of robotic cholecystectomy the risks and benefits of the procedure were explained detail to the patient. The risks include infection, bleeding, damage to the surrounding structures. Patient verbalized understanding provided consent to undergo the procedure above.       Procedure: Patient was brought back to the operating room and was placed on the operating table in the supine position.  The patient's extremities were padded and positioned in the usual fashion.  The patient then underwent anesthesia sedation and intubation.  The patient's abdomen was prepped and draped in the usual sterile fashion.  Prior to initiating the procedure, a timeout was completed.  All present were in agreement.    1% lidocaine with epinephrine was instilled at Gaona's point in the left upper quadrant.  A 15 blade was used to make a skin knick incision at Gaona's point.  A Veress needle was inserted into the abdomen and insufflation was initiated. An 8 mm trocar was inserted at the infraumbilical port site.   Insufflation was then initiated.  Once insufflation was completed, a general survey was completed.  I could identify that the patient had no injury of the abdominal contents upon entering the abdomen.  Distended gallbladder which is likely chronically inflamed.    An 8 mm port was placed to the right of the umbilicus.  Two 8 mm ports were placed in the left abdominal quadrant.  These ports were placed under direct visualization.  The robot was then docked and the robotic instruments were then inserted into the abdomen under direct visualization.  The gallbladder was then grasped with tip up graspers and retracted cephalad.  The cystic duct and cystic artery were then carefully dissected and isolated with a combination of blunt dissection with the hook electrocautery.  Once the cystic artery and cystic duct were isolated the critical view was obtained.  Operative cholangiogram was completed to confirm anatomy with firefly.  Weck clips were used to clip across the cystic artery and duct.  The hook cautery was used to transect between the Weck clips of the cystic duct and cystic artery.  The gallbladder was removed off of the liver bed using electrocautery.  The gallbladder was then removed from the abdomen using an Endo Catch bag through the 8 mm left lower quadrant port.      Inspection of the liver bed revealed good hemostasis.  The fascia of the gallbladder extraction site was then closed using an 0 Vicryl suture with a suture passer.  A 3-0 Vicryl suture was used to perform deep dermal sutures at the 12 mm port site.  All surgical sites were then closed with a 4-0 Vicryl suture in a subcuticular fashion.  Surgical glue was used to reinforce all surgical skin incisions.  At the end of the procedure, a final count was completed.  I was told all sharps, sponges, instruments were accounted for.  The patient tolerated the procedure with no complications.  The patient was then extubated and brought back to the PACU in  stable condition.      Nicho Flanagan DO  General Surgeon  [facility]  [facility] - [address]  [place] [address]?  Office: [phone]  Employed by - [employer]  Pager: [phone]

## 2024-02-03 NOTE — ANESTHESIA POSTPROCEDURE EVALUATION
Patient: El Booth    Procedure: Procedure(s):  CHOLECYSTECTOMY, ROBOT-ASSISTED, LAPAROSCOPIC, USING DA CHAD XI       Anesthesia Type:  General    Note:  Disposition: Inpatient   Postop Pain Control: Uneventful            Sign Out: Well controlled pain   PONV: No   Neuro/Psych: Uneventful            Sign Out: Acceptable/Baseline neuro status   Airway/Respiratory: Uneventful            Sign Out: Acceptable/Baseline resp. status   CV/Hemodynamics: Uneventful            Sign Out: Acceptable CV status; No obvious hypovolemia; No obvious fluid overload   Other NRE: NONE   DID A NON-ROUTINE EVENT OCCUR? No    Event details/Postop Comments:  Doing well, discharged to home.           Last vitals:  Vitals Value Taken Time   /59 02/03/24 1605   Temp 36.9  C (98.5  F) 02/03/24 1523   Pulse 100 02/03/24 1605   Resp 17 02/03/24 1605   SpO2 95 % 02/03/24 1605       Electronically Signed By: Sinan Wolf MD  February 3, 2024  4:35 PM

## 2024-02-06 LAB
PATH REPORT.COMMENTS IMP SPEC: NORMAL
PATH REPORT.COMMENTS IMP SPEC: NORMAL
PATH REPORT.FINAL DX SPEC: NORMAL
PATH REPORT.GROSS SPEC: NORMAL
PATH REPORT.MICROSCOPIC SPEC OTHER STN: NORMAL
PATH REPORT.RELEVANT HX SPEC: NORMAL
PHOTO IMAGE: NORMAL

## 2024-02-07 ENCOUNTER — MYC MEDICAL ADVICE (OUTPATIENT)
Dept: SURGERY | Facility: CLINIC | Age: 39
End: 2024-02-07
Payer: COMMERCIAL

## 2024-02-07 ENCOUNTER — LAB (OUTPATIENT)
Dept: LAB | Facility: CLINIC | Age: 39
End: 2024-02-07
Payer: COMMERCIAL

## 2024-02-07 DIAGNOSIS — R10.84 GENERALIZED ABDOMINAL PAIN: Primary | ICD-10-CM

## 2024-02-07 DIAGNOSIS — R10.84 GENERALIZED ABDOMINAL PAIN: ICD-10-CM

## 2024-02-07 LAB
ALBUMIN SERPL BCG-MCNC: 4 G/DL (ref 3.5–5.2)
ALP SERPL-CCNC: 89 U/L (ref 40–150)
ALT SERPL W P-5'-P-CCNC: 23 U/L (ref 0–50)
ANION GAP SERPL CALCULATED.3IONS-SCNC: 9 MMOL/L (ref 7–15)
AST SERPL W P-5'-P-CCNC: 18 U/L (ref 0–45)
BILIRUB DIRECT SERPL-MCNC: <0.2 MG/DL (ref 0–0.3)
BILIRUB SERPL-MCNC: 0.2 MG/DL
BUN SERPL-MCNC: 9 MG/DL (ref 6–20)
CALCIUM SERPL-MCNC: 9 MG/DL (ref 8.6–10)
CHLORIDE SERPL-SCNC: 104 MMOL/L (ref 98–107)
CREAT SERPL-MCNC: 0.62 MG/DL (ref 0.51–0.95)
DEPRECATED HCO3 PLAS-SCNC: 24 MMOL/L (ref 22–29)
EGFRCR SERPLBLD CKD-EPI 2021: >90 ML/MIN/1.73M2
ERYTHROCYTE [DISTWIDTH] IN BLOOD BY AUTOMATED COUNT: 12.9 % (ref 10–15)
GLUCOSE SERPL-MCNC: 128 MG/DL (ref 70–99)
HCT VFR BLD AUTO: 42.3 % (ref 35–47)
HGB BLD-MCNC: 14.4 G/DL (ref 11.7–15.7)
MCH RBC QN AUTO: 32 PG (ref 26.5–33)
MCHC RBC AUTO-ENTMCNC: 34 G/DL (ref 31.5–36.5)
MCV RBC AUTO: 94 FL (ref 78–100)
PLATELET # BLD AUTO: 338 10E3/UL (ref 150–450)
POTASSIUM SERPL-SCNC: 4.1 MMOL/L (ref 3.4–5.3)
PROT SERPL-MCNC: 6.9 G/DL (ref 6.4–8.3)
RBC # BLD AUTO: 4.5 10E6/UL (ref 3.8–5.2)
SODIUM SERPL-SCNC: 137 MMOL/L (ref 135–145)
WBC # BLD AUTO: 8.8 10E3/UL (ref 4–11)

## 2024-02-07 PROCEDURE — 85027 COMPLETE CBC AUTOMATED: CPT

## 2024-02-07 PROCEDURE — 82248 BILIRUBIN DIRECT: CPT

## 2024-02-07 PROCEDURE — 36415 COLL VENOUS BLD VENIPUNCTURE: CPT

## 2024-02-07 PROCEDURE — 80053 COMPREHEN METABOLIC PANEL: CPT

## 2024-02-07 NOTE — TELEPHONE ENCOUNTER
Called the patient to let her know that the labs were placed by Dr. Flanagan and she is planning to head over to [facility] to get them drawn.  She will also eventually need a return to work once a plan is in place and she is feeling better.  She states she is feeling okay today but hasn't eaten anything yet which is where she gets the most pain.    Airam Story RN

## 2024-02-08 NOTE — RESULT ENCOUNTER NOTE
Called patient with results.  Patient states that her abdominal pain is better.  She had 1 episode of pain in the epigastric region at 5 in the morning.  Otherwise she is doing fine.  Laboratory values are all nonremarkable at this time.  Patient is taking Prilosec 40 mg daily and it does help.  The patient continues to have epigastric pain, with me asked her to be evaluated by GI for an EGD to rule out gastritis versus a gastric etiology.    Nicho Flanagan DO  General Surgeon  [facility]  Surgery [facility] - [address] [address]?  Office: [phone]  Employed by - [employer]  Pager: [phone]

## 2024-02-12 NOTE — DISCHARGE SUMMARY
Physician Discharge Summary    Primary Care Physician:  Zaria Doherty    Discharge Provider: Nicho Flanagan DO     Admission Date: 2/2/2024.   Discharge Date: February 12, 2024   Admission Diagnoses: Biliary colic [K80.50]  Gallstones [K80.20]  Generalized abdominal pain [R10.84]  Left ovarian cyst [N83.202]   Disposition: home   Condition at Discharge: self     Principal Diagnosis:  Gallstones    Discharge Diagnoses:  Principal Problem:    Gallstones  Active Problems:    Biliary colic    Generalized abdominal pain    Left ovarian cyst      Procedures: No admission procedures for hospital encounter.    Hospital Summary:   37 yo female presenting with acute cholecystitis.  Pt underwent lap assisted robotic cholecystectomy on 2/3/24.  Pt tolerated the procedure well.  She was discharged to home in stable condition.      Discharge Medications:   See EMR    Discharge Instructions:  Follow up appointment with Primary Care Physician: Zaria Doherty  Follow up appointment with Specialist: General surgery team  Diet: reg  Activity: as tolerated  Wound / drain care: none      Nicho Flanagan DO  General Surgeon  [facility]  Surgery [facility] - [address] [address]?  Office: [phone]  Employed by [employer]

## 2024-02-13 ENCOUNTER — TELEPHONE (OUTPATIENT)
Dept: SURGERY | Facility: CLINIC | Age: 39
End: 2024-02-13
Payer: COMMERCIAL

## 2024-02-13 ENCOUNTER — DOCUMENTATION ONLY (OUTPATIENT)
Dept: SURGERY | Facility: CLINIC | Age: 39
End: 2024-02-13
Payer: COMMERCIAL

## 2024-02-13 NOTE — TELEPHONE ENCOUNTER
Pt called to inquire about scheduling an EGD that Dr. Flanagan discussed based on her symptoms after cholecystectomy. Referral for EGD placed in [employer] online portal and will be contacted by scheduling.       Called patient with results.  Patient states that her abdominal pain is better.  She had 1 episode of pain in the epigastric region at 5 in the morning.  Otherwise she is doing fine.  Laboratory values are all nonremarkable at this time.  Patient is taking Prilosec 40 mg daily and it does help.  The patient continues to have epigastric pain, with me asked her to be evaluated by GI for an EGD to rule out gastritis versus a gastric etiology.

## 2024-02-13 NOTE — PROGRESS NOTES
Federal Family and Medical Leave Act forms received, completed and signed on providers behalf.    Leave start date: 02/05/2024    Leave end date: 02/19/2024    RTW on 02/19/2024 with no restrictions.     Forms faxed to: [phone], attn: Sherry.    Fax successful.     Forms labeled and sent to HIM for scanning.    Apolinar Robin CMA  She/Her      [facility]  Surgery Clinic [facility] [place]  [facility] Clinic [address] [address]    Office: [phone]  Fax: [phone]

## 2024-02-15 ENCOUNTER — TRANSFERRED RECORDS (OUTPATIENT)
Dept: HEALTH INFORMATION MANAGEMENT | Facility: CLINIC | Age: 39
End: 2024-02-15
Payer: COMMERCIAL

## 2024-02-16 ENCOUNTER — TRANSFERRED RECORDS (OUTPATIENT)
Dept: HEALTH INFORMATION MANAGEMENT | Facility: CLINIC | Age: 39
End: 2024-02-16
Payer: COMMERCIAL

## 2024-03-15 ENCOUNTER — OFFICE VISIT (OUTPATIENT)
Dept: OBGYN | Facility: CLINIC | Age: 39
End: 2024-03-15
Payer: COMMERCIAL

## 2024-03-15 VITALS
DIASTOLIC BLOOD PRESSURE: 63 MMHG | OXYGEN SATURATION: 98 % | HEART RATE: 102 BPM | WEIGHT: 128.8 LBS | BODY MASS INDEX: 25.29 KG/M2 | HEIGHT: 60 IN | SYSTOLIC BLOOD PRESSURE: 105 MMHG

## 2024-03-15 DIAGNOSIS — Z11.3 SCREEN FOR STD (SEXUALLY TRANSMITTED DISEASE): ICD-10-CM

## 2024-03-15 DIAGNOSIS — Z01.419 WELL WOMAN EXAM WITH ROUTINE GYNECOLOGICAL EXAM: Primary | ICD-10-CM

## 2024-03-15 DIAGNOSIS — N83.202 LEFT OVARIAN CYST: ICD-10-CM

## 2024-03-15 DIAGNOSIS — Z00.00 PREVENTATIVE HEALTH CARE: ICD-10-CM

## 2024-03-15 DIAGNOSIS — N92.0 MENORRHAGIA WITH REGULAR CYCLE: ICD-10-CM

## 2024-03-15 PROCEDURE — 87591 N.GONORRHOEAE DNA AMP PROB: CPT | Performed by: OBSTETRICS & GYNECOLOGY

## 2024-03-15 PROCEDURE — 99385 PREV VISIT NEW AGE 18-39: CPT | Performed by: OBSTETRICS & GYNECOLOGY

## 2024-03-15 PROCEDURE — 36415 COLL VENOUS BLD VENIPUNCTURE: CPT | Performed by: OBSTETRICS & GYNECOLOGY

## 2024-03-15 PROCEDURE — 87491 CHLMYD TRACH DNA AMP PROBE: CPT | Performed by: OBSTETRICS & GYNECOLOGY

## 2024-03-15 PROCEDURE — 86780 TREPONEMA PALLIDUM: CPT | Performed by: OBSTETRICS & GYNECOLOGY

## 2024-03-15 PROCEDURE — 99213 OFFICE O/P EST LOW 20 MIN: CPT | Mod: 25 | Performed by: OBSTETRICS & GYNECOLOGY

## 2024-03-15 PROCEDURE — 86592 SYPHILIS TEST NON-TREP QUAL: CPT | Performed by: OBSTETRICS & GYNECOLOGY

## 2024-03-15 PROCEDURE — 99459 PELVIC EXAMINATION: CPT | Performed by: OBSTETRICS & GYNECOLOGY

## 2024-03-15 PROCEDURE — 86593 SYPHILIS TEST NON-TREP QUANT: CPT | Performed by: OBSTETRICS & GYNECOLOGY

## 2024-03-15 PROCEDURE — 87389 HIV-1 AG W/HIV-1&-2 AB AG IA: CPT | Performed by: OBSTETRICS & GYNECOLOGY

## 2024-03-15 RX ORDER — ACETAMINOPHEN 500 MG
500-1000 TABLET ORAL EVERY 6 HOURS PRN
COMMUNITY

## 2024-03-15 RX ORDER — NAPROXEN SODIUM 220 MG
220 TABLET ORAL DAILY PRN
COMMUNITY

## 2024-03-15 ASSESSMENT — PATIENT HEALTH QUESTIONNAIRE - PHQ9: SUM OF ALL RESPONSES TO PHQ QUESTIONS 1-9: 6

## 2024-03-15 NOTE — PROGRESS NOTES
"El is a 38 year old  female who presents for annual exam.     Menses are regular q 28-30 days and normal and heavy lasting 4 days.  Menses flow: light, medium, and heavy.  Patient's last menstrual period was 2024 (exact date).. Using salpingectomy for contraception.  She is not currently considering pregnancy.  She has a h/o 4 ectopic pregnancies, surgical treatment x 3 with removal of both tubes.  Besides routine health maintenance,  she would like to discuss ovarian cyst seen on ultrasound prior to gallbladder surgery last month.  It showed a 3.2cm hemorrhagic cyst.  She denies pain, bleeding or symptoms she can relate to the cyst.  She plans no further attempt at pregnancy, periods are heavy and bothersome many months, wondering what her options are for mgmt.  GYNECOLOGIC HISTORY:  Menarche: 10  Age at first intercourse: 13 Number of lifetime partners: more than 5  El is not sexually active with 0male partner(s) and is not currently in monogamous relationship.    History sexually transmitted infections:Chlamydia, Trichomonas, and Bacterial vaginosis  STI testing offered?  Accepted  JOHANA exposure: Unknown  History of abnormal Pap smear: NO - age 30-65 PAP every 5 years with negative HPV co-testing recommended  Family history of breast CA: Yes (Please explain): sister, pat cousin, mat gma, mat aunt  Family history of uterine/ovarian CA: No    Family history of colon CA: No    HEALTH MAINTENANCE:  Cholesterol: (No results found for: \"CHOL\" History of abnormal lipids: No  Mammo: none . History of abnormal Mammo: Not applicable.  Regular Self Breast Exams: No  Calcium/Vitamin D intake: source:  dairy, dietary supplement(s) Adequate? Yes  TSH: (No results found for: \"TSH\" )  Pap; (No results found for: \"PAP\" )    HISTORY:  OB History    Para Term  AB Living   5 1 1 0 4 1   SAB IAB Ectopic Multiple Live Births   0 0 4 0 1      # Outcome Date GA Lbr Tim/2nd Weight Sex Delivery Anes PTL " Lv   5 Ectopic      ECTOPIC      4 Term      Vag-Spont   ERICA   3 Ectopic      ECTOPIC      2 Ectopic      ECTOPIC      1 Ectopic      ECTOPIC        Past Medical History:   Diagnosis Date    Anxiety     Chronic headache     Ectopic pregnancy      Past Surgical History:   Procedure Laterality Date    LAPAROSCOPIC CHOLECYSTECTOMY N/A 2/3/2024    Procedure: CHOLECYSTECTOMY, ROBOT-ASSISTED, LAPAROSCOPIC, USING DA CHAD XI;  Surgeon: Nicho Flanagan DO;  Location: [facility] OR    AK LAP,DIAGNOSTIC ABDOMEN N/A 6/8/2018    Procedure: LAPAROSCOPY LEFT SIDED SALPINGECTOMY EVACUATION HEMO PERITONEUM;  Surgeon: Clemencia Aleman DO;  [facility]: [facility];  Service: Gynecology     Family History   Problem Relation Age of Onset    Depression Mother     Depression Father     Hyperlipidemia Father      Social History     Socioeconomic History    Marital status: Single     Spouse name: None    Number of children: None    Years of education: None    Highest education level: None   Tobacco Use    Smoking status: Former   Substance and Sexual Activity    Alcohol use: Yes    Drug use: Yes     Types: Oxycodone       Current Outpatient Medications:     acetaminophen (TYLENOL) 500 MG tablet, Take 500-1,000 mg by mouth every 6 hours as needed, Disp: , Rfl:     biotin 1000 MCG TABS tablet, Take 1,000 mcg by mouth at bedtime, Disp: , Rfl:     docusate sodium (COLACE) 100 MG capsule, Take 1 capsule (100 mg) by mouth 2 times daily, Disp: 30 capsule, Rfl: 0    escitalopram (LEXAPRO) 10 MG tablet, Take 10 mg by mouth at bedtime, Disp: , Rfl:     magic mouthwash (ENTER INGREDIENTS IN COMMENTS) suspension, Take 15 mLs by mouth 3 times daily Antacid, diphenhydramine, lidocaine, nystatin, Disp: , Rfl:     Multiple Vitamin (ONE-A-DAY ESSENTIAL) TABS, Take 1 tablet by mouth at bedtime, Disp: , Rfl:     naproxen sodium (ANAPROX) 220 MG tablet, Take 220 mg by mouth daily as needed, Disp: , Rfl:     omeprazole (PRILOSEC) 40 MG DR capsule,  Take 40 mg by mouth at bedtime, Disp: , Rfl:     traZODone (DESYREL) 100 MG tablet, Take 150 mg by mouth at bedtime, Disp: , Rfl:     triamcinolone (KENALOG) 0.1 % paste, Take by mouth 2 times daily, Disp: , Rfl:     vitamin C (ASCORBIC ACID) 500 MG tablet, Take 500 mg by mouth at bedtime, Disp: , Rfl:     Vitamin D3 (VITAMIN D, CHOLECALCIFEROL,) 25 mcg (1000 units) tablet, Take 1 tablet by mouth at bedtime, Disp: , Rfl:    No Known Allergies    Past medical, surgical, social and family history were reviewed and updated in EPIC.    ROS:   C:     NEGATIVE for fever, chills, change in weight  I:       NEGATIVE for worrisome rashes, moles or lesions  E:     NEGATIVE for vision changes or irritation  E/M: NEGATIVE for ear, mouth and throat problems  R:     NEGATIVE for significant cough or SOB  CV:   NEGATIVE for chest pain, palpitations or peripheral edema  GI:     NEGATIVE for nausea, abdominal pain, heartburn, or change in bowel habits  :   NEGATIVE for frequency, dysuria, hematuria, vaginal discharge, or irregular bleeding  M:     NEGATIVE for significant arthralgias or myalgia  N:      NEGATIVE for weakness, dizziness or paresthesias  E:      NEGATIVE for temperature intolerance, skin/hair changes  P:      NEGATIVE for changes in mood or affect.    EXAM:  /63   Pulse 102   Ht 1.524 m (5')   Wt 58.4 kg (128 lb 12.8 oz)   LMP 03/05/2024 (Exact Date)   SpO2 98%   BMI 25.15 kg/m     BMI: Body mass index is 25.15 kg/m .  Constitutional: healthy, alert and no distress  Head: Normocephalic. No masses, lesions, tenderness or abnormalities  Neck: Neck supple. Trachea midline. No adenopathy. Thyroid symmetric, normal size.   Cardiovascular: RRR.   Respiratory: Negative.   Breast: No nodularity, asymmetry or nipple discharge bilaterally.  Gastrointestinal: Abdomen soft, non-tender, non-distended. No masses, organomegaly.  :  Vulva:  No external lesions, normal female hair distribution, no inguinal adenopathy.     Urethra:  Midline, non-tender, well supported, no discharge  Vagina:  Moist, pink, no abnormal discharge, no lesions  Uterus:  Normal size, non-tender, freely mobile  Ovaries:  No masses appreciated, non-tender, mobile  Rectal Exam: deferred  Musculoskeletal: extremities normal  Skin: no suspicious lesions or rashes  Psychiatric: Affect appropriate, cooperative,mentation appears normal.     COUNSELING:   Reviewed preventive health counseling, as reflected in patient instructions  Special attention given to:        Regular exercise       Healthy diet/nutrition       Osteoporosis prevention/bone health       Safe sex practices/STD prevention   reports that she has quit smoking. She does not have any smokeless tobacco history on file.    Body mass index is 25.15 kg/m .    FRAX Risk Assessment    ASSESSMENT:  38 year old female with satisfactory annual exam  Plan: pap UTD.  Labs done with PCP last fall, reports normal.  STD screening today.  Mammo referral.  RTC yearly or prn.  2) left ovarian cyst  Discussed the benign nature of hemorrhagic cyst and spontaneous resolution of most.  Will plan repeat us next month to confirm resolution.  Order placed.    3) heavy menses  We discussed use of OCPs, mirena, endometrial ablation for mgmt of menses as fertility no longer a concern.  She does not want any hormonal contraceptive, or IUD, would potential consider ablation in near future, will let me know    LYNN RICH MD  In addition to preventative care, 10min were spent discussing ovarian cyst mgmt and options for menstrual mgmt.

## 2024-03-16 LAB
C TRACH DNA SPEC QL PROBE+SIG AMP: NEGATIVE
HIV 1+2 AB+HIV1 P24 AG SERPL QL IA: NONREACTIVE
N GONORRHOEA DNA SPEC QL NAA+PROBE: NEGATIVE
T PALLIDUM AB SER QL: REACTIVE

## 2024-03-18 ENCOUNTER — TELEPHONE (OUTPATIENT)
Dept: OBGYN | Facility: CLINIC | Age: 39
End: 2024-03-18
Payer: COMMERCIAL

## 2024-03-18 DIAGNOSIS — A53.9 ACQUIRED SYPHILIS: Primary | ICD-10-CM

## 2024-03-18 LAB — RPR SER QL: REACTIVE

## 2024-03-18 NOTE — TELEPHONE ENCOUNTER
I called Howardryder DAIANA Booth to relay and discuss her recent STD testing which showed a positive syphillis result and confirmation.  I received a voicemail and left a message for a return call with callback number.    I will send her a result message as well to let her know that our nurse will be contacting the [place] dept of Health to help with treatment planning.  Анна is aware of this patient.    LYNN RICH MD

## 2024-03-19 LAB — RPR SER-TITR: ABNORMAL {TITER}

## 2024-03-19 NOTE — TELEPHONE ENCOUNTER
[facility] Call Center    Phone Message    May a detailed message be left on voicemail: yes     Reason for Call: Other: Pt returning phone call to provider/care team regarding recent lab results. Writer unable to reach care team via secure chat at this time.Please contact pt to go over results and next steps when able.     Action Taken: Message routed to:  Other: HANNAH CONROY    Travel Screening: Not Applicable

## 2024-03-20 NOTE — TELEPHONE ENCOUNTER
Spoke with MD and the recommended course of treatment for this patient is:  Benzathine penicillin .2 million units totally, administered as 3 doses of 2.4 million units IM each at 1 week intervals.     Patient called and notified.    She was out running errands but will call back and we can assist in scheduling these 3 injections.    Once these are scheduled - we will send out the [facility] report form.    Анна Mccarthy RN

## 2024-03-20 NOTE — TELEPHONE ENCOUNTER
Did call patient and discuss lab results - patient states she did not have symptoms and has never tested positive for syphilis.    Wanted to discuss with patient that we are working with MD for appropriate treatment plan and to keep her in the loop.    Advised we should hear back from [facility] today and hopefull touch base with her tomorrow.  If she has not heard from us by tomorrow afternoon, advised for her to call back for a status check in.    Анна Mccarthy RN

## 2024-03-20 NOTE — TELEPHONE ENCOUNTER
Patient scheduled for 3 series injections on    3/21/24  3/28/24  4/4/24    MD form faxed.    Анна Mccarthy RN

## 2024-03-20 NOTE — TELEPHONE ENCOUNTER
Per [facility] Form:  Patient had screening complete, at time did not have any signs/symptoms, do documented exposure, and a negative test result in the past     Then - patient is to receive Benzathine penicillin .2 million units totally, administered as 3 doses of 2.4 million units IM each at 1 week intervals.    RN attempted to contact MD to confirm treatment plan.  Once confirmed - will attempt to call the patient back with treatment plan and assist in scheduling IM injections.    Анна Mccarthy RN

## 2024-03-21 ENCOUNTER — ALLIED HEALTH/NURSE VISIT (OUTPATIENT)
Dept: NURSING | Facility: CLINIC | Age: 39
End: 2024-03-21
Payer: COMMERCIAL

## 2024-03-21 DIAGNOSIS — A53.9 SYPHILIS: Primary | ICD-10-CM

## 2024-03-21 PROCEDURE — 96372 THER/PROPH/DIAG INJ SC/IM: CPT | Performed by: OBSTETRICS & GYNECOLOGY

## 2024-03-21 PROCEDURE — 99207 PR NO CHARGE NURSE ONLY: CPT

## 2024-03-21 NOTE — PROGRESS NOTES
Clinic Administered Medication Documentation      Injectable Medication Documentation    Is there an active order (written within the past 365 days, with administrations remaining, not ) in the chart? Yes.     Patient was given Penicillin G Benzathine (Bicillin). Prior to medication administration, verified patient's identity using patient s name and date of birth. Please see MAR and medication order for additional information. Patient instructed to remain in clinic for 15 minutes and report any adverse reaction to staff immediately.    Vial/Syringe: Syringe  Was this medication supplied by the patient? No  Is this a medication the patient will need to receive again? No - not necessary to check for refills remaining.    Pt verified no allergies to meds  One of each syringe injected into each ventrogluteal site.   Pt understands can be tender/sore   Reiterated need for pt to adhere to all 3 weeks of treatment, and will need to start over if misses a dose  Pt tolerated injections well.    Scheduled at 8:15 for repeat injection next Thursday 3/28.  Pt verbalized understanding, in agreement with plan, and voiced no further questions.  Amanda Torres RN on 3/21/2024 at 8:44 AM

## 2024-03-28 ENCOUNTER — ALLIED HEALTH/NURSE VISIT (OUTPATIENT)
Dept: NURSING | Facility: CLINIC | Age: 39
End: 2024-03-28
Payer: COMMERCIAL

## 2024-03-28 DIAGNOSIS — A53.9 SYPHILIS: Primary | ICD-10-CM

## 2024-03-28 PROCEDURE — 99207 PR NO CHARGE NURSE ONLY: CPT

## 2024-03-28 PROCEDURE — 96372 THER/PROPH/DIAG INJ SC/IM: CPT | Performed by: OBSTETRICS & GYNECOLOGY

## 2024-03-28 NOTE — PROGRESS NOTES
Clinic Administered Medication Documentation      Injectable Medication Documentation    Is there an active order (written within the past 365 days, with administrations remaining, not ) in the chart? Yes.     Patient was given Penicillin G Benzathine (Bicillin). Prior to medication administration, verified patient's identity using patient s name and date of birth. Please see MAR and medication order for additional information. Patient instructed to remain in clinic for 15 minutes and report any adverse reaction to staff immediately.    Vial/Syringe: Syringe x2 (1.2million units x2 for 2.4 million)  Was this medication supplied by the patient? No  Is this a medication the patient will need to receive again? No - not necessary to check for refills remaining.    Pt tolerated bilateral injections well-will notify of any adverse reactions. Pt had flu like symptoms/body aches after last dose prior to today-RN verified with Dr. Amaral in clinic that this can be very common with syphillis/pcn treatment and no concerns.  Pt aware of last dose due next Thursday.  Pt verbalized understanding, in agreement with plan, and voiced no further questions.  Amanda Torres RN on 3/28/2024 at 8:43 AM

## 2024-04-04 ENCOUNTER — ALLIED HEALTH/NURSE VISIT (OUTPATIENT)
Dept: NURSING | Facility: CLINIC | Age: 39
End: 2024-04-04
Payer: COMMERCIAL

## 2024-04-04 DIAGNOSIS — A53.9 SYPHILIS: Primary | ICD-10-CM

## 2024-04-04 PROCEDURE — 96372 THER/PROPH/DIAG INJ SC/IM: CPT | Performed by: OBSTETRICS & GYNECOLOGY

## 2024-04-04 PROCEDURE — 99207 PR NO CHARGE NURSE ONLY: CPT

## 2024-04-04 NOTE — PROGRESS NOTES
Clinic Administered Medication Documentation      Injectable Medication Documentation    Is there an active order (written within the past 365 days, with administrations remaining, not ) in the chart? Yes.     Patient was given Penicillin G Benzathine (Bicillin). Prior to medication administration, verified patient's identity using patient s name and date of birth. Please see MAR and medication order for additional information. Patient instructed to remain in clinic for 15 minutes and report any adverse reaction to staff immediately.    Vial/Syringe: Syringe  Was this medication supplied by the patient? No  Is this a medication the patient will need to receive again? No - not necessary to check for refills remaining.    Pt in clinic today to 3rd of 3 PCN injections.   Pt reported no adverse affects/improved flu-like symptoms compared to 1st injection.  Medication injected into bilateral ventrogluteal sites, no complaints from patient    Will contact pt if follow up testing needed.   Pt verbalized understanding, in agreement with plan, and voiced no further questions.  Amanda Torres RN on 2024 at 9:35 AM

## 2024-04-15 ENCOUNTER — TELEPHONE (OUTPATIENT)
Dept: SURGERY | Facility: CLINIC | Age: 39
End: 2024-04-15
Payer: COMMERCIAL

## 2024-04-15 NOTE — TELEPHONE ENCOUNTER
Patient calling back to talk to Apolinar regarding her [place] paperwork,  Please call her back.    Thanks!

## 2024-05-01 ENCOUNTER — TELEPHONE (OUTPATIENT)
Dept: SURGERY | Facility: CLINIC | Age: 39
End: 2024-05-01
Payer: COMMERCIAL

## 2024-05-01 NOTE — TELEPHONE ENCOUNTER
Spoke to pt, she faxed new paperwork for intermittent leave due to other health issues that occurred post surgery.   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 Encouraged pt to reach out to PCP or [facility] whom she had testing done with to see if they can help with completing paperwork.    Pt had no further questions and was appreciative of the information that I explained to her.

## 2024-05-01 NOTE — TELEPHONE ENCOUNTER
LM to discuss fax from [employer] that came in. Pts surgery was back in Feb if 2024 and documented by Apolinar that paperwork was filled out.     Provided my direct number for pt to call back at

## 2024-07-01 ENCOUNTER — TELEPHONE (OUTPATIENT)
Dept: OBGYN | Facility: CLINIC | Age: 39
End: 2024-07-01
Payer: COMMERCIAL

## 2024-07-01 NOTE — TELEPHONE ENCOUNTER
I don't see any documentation about an adverse reaction to penicillin.  The last of 3 injections given by us reports no previous adverse reaction after first 2 shots.  Can you contact her and find out what reaction she had and where was she seen for that? Without documentation of adverse reaction, I cannot sign paperwork stating she was out of work due to it.  And if she was seen and treated by someone outside of our healthsystem, she would need to send paperwork to them as they addressed the concern and authorized time off work. Thanks    LYNN RICH MD

## 2024-07-01 NOTE — TELEPHONE ENCOUNTER
[facility] Call Center    Phone Message    May a detailed message be left on voicemail: yes     Reason for Call: Form or Letter   Type or form/letter needing completion: [employer] / [employer]  Provider: Dr Boateng  Date form needed: ASAP  Once completed: Patient will  at . - pt looking to confirm that the paperwork has been received. Please contact pt to discuss when able.     Action Taken: Other: HANNAH [id number]    Travel Screening: Not Applicable     Date of Service:

## 2024-07-01 NOTE — TELEPHONE ENCOUNTER
PAPERWORK REQUEST    Form received on: 07/01/2024  How was form received: Fax  Preferred Provider: Jana Boateng MD  Type of form: FMLA  Date needed by: OANH (7/6/2024)  What to do with form once completed: Please fax to [phone], please call patient once faxed  Where was form placed?: provider basket  Has an HELIO been signed? Not Applicable    Additional Notes: Filled out and placed in provider basket for signature. Pt needing FMLA approved for 3 day absence following initial penicillin injection.

## 2024-07-02 NOTE — TELEPHONE ENCOUNTER
"Per note from 3/28/24 allied health/nurse visit  \"Pt had flu like symptoms/body aches after last dose prior to today-RN verified with Dr. Amaral in clinic that this can be very common with syphillis/pcn treatment and no concerns.\"    At 3rd injection apt on 4/4/24  \"Pt reported no adverse affects/improved flu-like symptoms compared to 1st injection.\"    RN called El to confirm, she was not seen for these symptoms, but stayed home from work due to the severity of them.  Onset of flu like symptoms was abut 3 hours after the first pnc shot.    Karo RN      "

## 2024-07-05 ENCOUNTER — TELEPHONE (OUTPATIENT)
Dept: OBGYN | Facility: CLINIC | Age: 39
End: 2024-07-05
Payer: COMMERCIAL

## 2024-07-05 NOTE — TELEPHONE ENCOUNTER
[facility] Call Center    Phone Message    May a detailed message be left on voicemail: yes     Reason for Call: Other: Pt called looking for an update on her FMLA paperwork since her employer has not received it yet. Writer was not able to get in touch with secure chat. Please contact pt.      Action Taken: Message routed to:  Other: RD OB    Travel Screening: Not Applicable     Date of Service:

## 2024-07-05 NOTE — TELEPHONE ENCOUNTER
I signed them earlier in the week and placed them in the done box.  Thanks    LYNN RICH MD     SEPRAFILM WAS GIVEN TO THE STERILE FIELD TO BE USED BY MD DURING PROCEDURE  REF: [id number]  LOT: [id number]  EXP: 10/27/2024

## 2024-07-05 NOTE — TELEPHONE ENCOUNTER
The nurses did reach out to the patient and confirmed notes (see TE from 7/1). Are we able to sign off on these forms for her? They are due tomorrow if at all possible

## 2024-07-11 ENCOUNTER — TELEPHONE (OUTPATIENT)
Dept: OBGYN | Facility: CLINIC | Age: 39
End: 2024-07-11
Payer: COMMERCIAL

## 2024-07-11 NOTE — TELEPHONE ENCOUNTER
[facility] Call Center    Phone Message    May a detailed message be left on voicemail: yes     Reason for Call: Other: Pt was calling in regards to her FMLA forms. PT received notice on 7/5 that forms were faxed back to employer. Employer has not received these forms and pt is wondering if they can be faxed again. Fax is [phone]. Pt also requested a call back about this as these forms are now past due. Please fax and call pt back. Thank you.      Action Taken: Other: OBGYN    Travel Screening: Not Applicable     Date of Service:

## 2024-07-21 ENCOUNTER — HEALTH MAINTENANCE LETTER (OUTPATIENT)
Age: 39
End: 2024-07-21

## 2025-04-27 ENCOUNTER — HEALTH MAINTENANCE LETTER (OUTPATIENT)
Age: 40
End: 2025-04-27

## (undated) DEVICE — NDL INSUFFLATION 13GA 120MM C2201

## (undated) DEVICE — ENDO POUCH UNIVERSAL RETRIEVAL SYSTEM INZII 5MM CD003

## (undated) DEVICE — BLADE KNIFE SURG 11 371111

## (undated) DEVICE — Device

## (undated) DEVICE — DECANTER VIAL 2006S

## (undated) DEVICE — DAVINCI XI SEAL UNIVERSAL 5-8MM 470361

## (undated) DEVICE — DRAPE SHEET REV FOLD 3/4 9349

## (undated) DEVICE — SUTURE PASSOR W/GUIDE RSG-14F-4-WG

## (undated) DEVICE — DAVINCI XI DRAPE COLUMN 470341

## (undated) DEVICE — SU DERMABOND ADVANCED .7ML DNX12

## (undated) DEVICE — CUSTOM PACK LAP CHOLE SBA5BLCHEA

## (undated) DEVICE — SOL WATER IRRIG 1000ML BOTTLE 2F7114

## (undated) DEVICE — GOWN XLG DISP 9545

## (undated) DEVICE — ELECTRODE PATIENT RETURN ADULT L10 FT 2 PLATE CORD 0855C

## (undated) DEVICE — DRAPE U SPLIT 74X120" 29440

## (undated) DEVICE — CLIP ENDO HEMO-LOC PURPLE LG 544240

## (undated) DEVICE — GLOVE UNDER INDICATOR PI SZ 7.0 LF 41670

## (undated) DEVICE — DAVINCI XI OBTURATOR BLADELESS 8MM 470359

## (undated) DEVICE — SYR 50ML SLIP TIP W/O NDL 309654

## (undated) DEVICE — LUBRICANT INST ELECTROLUBE EL101

## (undated) DEVICE — DAVINCI XI DRAPE ARM 470015

## (undated) DEVICE — SUTURE VICRYL+ 0 27IN CT-1 UND VCP260H

## (undated) DEVICE — SU VICRYL+ 3-0 27IN SH UND VCP416H

## (undated) DEVICE — PREP CHLORAPREP 26ML TINTED HI-LITE ORANGE 930815

## (undated) DEVICE — TUBING SMOKE EVAC PNEUMOCLEAR HIGH FLOW 0620050250

## (undated) DEVICE — SUTURE VICRYL+ 4-0 UNDYED PS-2 VCP496H

## (undated) RX ORDER — LIDOCAINE HYDROCHLORIDE 10 MG/ML
INJECTION, SOLUTION EPIDURAL; INFILTRATION; INTRACAUDAL; PERINEURAL
Status: DISPENSED
Start: 2024-02-03

## (undated) RX ORDER — DEXMEDETOMIDINE HYDROCHLORIDE 4 UG/ML
INJECTION, SOLUTION INTRAVENOUS
Status: DISPENSED
Start: 2024-02-03

## (undated) RX ORDER — FENTANYL CITRATE 50 UG/ML
INJECTION, SOLUTION INTRAMUSCULAR; INTRAVENOUS
Status: DISPENSED
Start: 2024-02-03

## (undated) RX ORDER — PROPOFOL 10 MG/ML
INJECTION, EMULSION INTRAVENOUS
Status: DISPENSED
Start: 2024-02-03

## (undated) RX ORDER — ONDANSETRON 2 MG/ML
INJECTION INTRAMUSCULAR; INTRAVENOUS
Status: DISPENSED
Start: 2024-02-03

## (undated) RX ORDER — DEXAMETHASONE SODIUM PHOSPHATE 10 MG/ML
INJECTION, EMULSION INTRAMUSCULAR; INTRAVENOUS
Status: DISPENSED
Start: 2024-02-03

## (undated) RX ORDER — DIPHENHYDRAMINE HYDROCHLORIDE 50 MG/ML
INJECTION INTRAMUSCULAR; INTRAVENOUS
Status: DISPENSED
Start: 2024-02-03

## (undated) RX ORDER — KETOROLAC TROMETHAMINE 30 MG/ML
INJECTION, SOLUTION INTRAMUSCULAR; INTRAVENOUS
Status: DISPENSED
Start: 2024-02-03